# Patient Record
Sex: FEMALE | Race: WHITE | NOT HISPANIC OR LATINO
[De-identification: names, ages, dates, MRNs, and addresses within clinical notes are randomized per-mention and may not be internally consistent; named-entity substitution may affect disease eponyms.]

---

## 2017-10-15 ENCOUNTER — HEALTH MAINTENANCE LETTER (OUTPATIENT)
Age: 27
End: 2017-10-15

## 2018-01-02 ENCOUNTER — APPOINTMENT (OUTPATIENT)
Dept: ULTRASOUND IMAGING | Facility: CLINIC | Age: 28
End: 2018-01-02
Attending: EMERGENCY MEDICINE
Payer: COMMERCIAL

## 2018-01-02 ENCOUNTER — ANESTHESIA EVENT (OUTPATIENT)
Dept: SURGERY | Facility: CLINIC | Age: 28
End: 2018-01-02
Payer: COMMERCIAL

## 2018-01-02 ENCOUNTER — HOSPITAL ENCOUNTER (INPATIENT)
Facility: CLINIC | Age: 28
LOS: 5 days | Discharge: HOME | End: 2018-01-07
Attending: EMERGENCY MEDICINE | Admitting: SURGERY
Payer: COMMERCIAL

## 2018-01-02 ENCOUNTER — APPOINTMENT (OUTPATIENT)
Dept: CT IMAGING | Facility: CLINIC | Age: 28
End: 2018-01-02
Attending: EMERGENCY MEDICINE
Payer: COMMERCIAL

## 2018-01-02 ENCOUNTER — ANESTHESIA (OUTPATIENT)
Dept: SURGERY | Facility: CLINIC | Age: 28
End: 2018-01-02
Payer: COMMERCIAL

## 2018-01-02 DIAGNOSIS — K35.80 ACUTE APPENDICITIS, UNSPECIFIED ACUTE APPENDICITIS TYPE: ICD-10-CM

## 2018-01-02 DIAGNOSIS — K35.32: Primary | ICD-10-CM

## 2018-01-02 LAB
ALBUMIN SERPL-MCNC: 3.7 G/DL (ref 3.4–5)
ALBUMIN UR-MCNC: NEGATIVE MG/DL
ALP SERPL-CCNC: 105 U/L (ref 40–150)
ALT SERPL W P-5'-P-CCNC: 51 U/L (ref 0–50)
ANION GAP SERPL CALCULATED.3IONS-SCNC: 8 MMOL/L (ref 3–14)
APPEARANCE UR: CLEAR
AST SERPL W P-5'-P-CCNC: 26 U/L (ref 0–45)
B-HCG SERPL-ACNC: <1 IU/L (ref 0–5)
BACTERIA #/AREA URNS HPF: ABNORMAL /HPF
BASOPHILS # BLD AUTO: 0 10E9/L (ref 0–0.2)
BASOPHILS NFR BLD AUTO: 0.2 %
BILIRUB SERPL-MCNC: 0.8 MG/DL (ref 0.2–1.3)
BILIRUB UR QL STRIP: NEGATIVE
BUN SERPL-MCNC: 16 MG/DL (ref 7–30)
CALCIUM SERPL-MCNC: 8.9 MG/DL (ref 8.5–10.1)
CHLORIDE SERPL-SCNC: 103 MMOL/L (ref 94–109)
CO2 SERPL-SCNC: 26 MMOL/L (ref 20–32)
COLOR UR AUTO: YELLOW
CREAT SERPL-MCNC: 0.59 MG/DL (ref 0.52–1.04)
CREAT SERPL-MCNC: 0.61 MG/DL (ref 0.52–1.04)
DIFFERENTIAL METHOD BLD: ABNORMAL
EOSINOPHIL # BLD AUTO: 0 10E9/L (ref 0–0.7)
EOSINOPHIL NFR BLD AUTO: 0 %
ERYTHROCYTE [DISTWIDTH] IN BLOOD BY AUTOMATED COUNT: 11.9 % (ref 10–15)
GFR SERPL CREATININE-BSD FRML MDRD: >90 ML/MIN/1.7M2
GFR SERPL CREATININE-BSD FRML MDRD: >90 ML/MIN/1.7M2
GLUCOSE SERPL-MCNC: 169 MG/DL (ref 70–99)
GLUCOSE UR STRIP-MCNC: NEGATIVE MG/DL
GRAM STN SPEC: ABNORMAL
GRAM STN SPEC: ABNORMAL
HCT VFR BLD AUTO: 42.4 % (ref 35–47)
HGB BLD-MCNC: 14 G/DL (ref 11.7–15.7)
HGB UR QL STRIP: NEGATIVE
IMM GRANULOCYTES # BLD: 0.1 10E9/L (ref 0–0.4)
IMM GRANULOCYTES NFR BLD: 0.4 %
KETONES UR STRIP-MCNC: NEGATIVE MG/DL
LACTATE BLD-SCNC: 1.9 MMOL/L (ref 0.7–2)
LEUKOCYTE ESTERASE UR QL STRIP: NEGATIVE
LIPASE SERPL-CCNC: 141 U/L (ref 73–393)
LYMPHOCYTES # BLD AUTO: 1.5 10E9/L (ref 0.8–5.3)
LYMPHOCYTES NFR BLD AUTO: 8.8 %
MCH RBC QN AUTO: 28.7 PG (ref 26.5–33)
MCHC RBC AUTO-ENTMCNC: 33 G/DL (ref 31.5–36.5)
MCV RBC AUTO: 87 FL (ref 78–100)
MONOCYTES # BLD AUTO: 0.6 10E9/L (ref 0–1.3)
MONOCYTES NFR BLD AUTO: 3.7 %
MUCOUS THREADS #/AREA URNS LPF: PRESENT /LPF
NEUTROPHILS # BLD AUTO: 14.5 10E9/L (ref 1.6–8.3)
NEUTROPHILS NFR BLD AUTO: 86.9 %
NITRATE UR QL: NEGATIVE
NRBC # BLD AUTO: 0 10*3/UL
NRBC BLD AUTO-RTO: 0 /100
PH UR STRIP: 7 PH (ref 5–7)
PLATELET # BLD AUTO: 198 10E9/L (ref 150–450)
PLATELET # BLD AUTO: 240 10E9/L (ref 150–450)
POTASSIUM SERPL-SCNC: 3.7 MMOL/L (ref 3.4–5.3)
PROT SERPL-MCNC: 7.8 G/DL (ref 6.8–8.8)
RBC # BLD AUTO: 4.87 10E12/L (ref 3.8–5.2)
RBC #/AREA URNS AUTO: 0 /HPF (ref 0–2)
SODIUM SERPL-SCNC: 137 MMOL/L (ref 133–144)
SOURCE: ABNORMAL
SP GR UR STRIP: 1.01 (ref 1–1.03)
SPECIMEN SOURCE: ABNORMAL
SQUAMOUS #/AREA URNS AUTO: <1 /HPF (ref 0–1)
UROBILINOGEN UR STRIP-MCNC: 0 MG/DL (ref 0–2)
WBC # BLD AUTO: 16.7 10E9/L (ref 4–11)
WBC #/AREA URNS AUTO: <1 /HPF (ref 0–2)

## 2018-01-02 PROCEDURE — 25000125 ZZHC RX 250: Performed by: NURSE ANESTHETIST, CERTIFIED REGISTERED

## 2018-01-02 PROCEDURE — 0DTJ4ZZ RESECTION OF APPENDIX, PERCUTANEOUS ENDOSCOPIC APPROACH: ICD-10-PCS | Performed by: SURGERY

## 2018-01-02 PROCEDURE — 25000128 H RX IP 250 OP 636: Performed by: SURGERY

## 2018-01-02 PROCEDURE — 96375 TX/PRO/DX INJ NEW DRUG ADDON: CPT

## 2018-01-02 PROCEDURE — 44970 LAPAROSCOPY APPENDECTOMY: CPT | Performed by: SURGERY

## 2018-01-02 PROCEDURE — 25000128 H RX IP 250 OP 636: Performed by: NURSE ANESTHETIST, CERTIFIED REGISTERED

## 2018-01-02 PROCEDURE — 25000128 H RX IP 250 OP 636: Performed by: EMERGENCY MEDICINE

## 2018-01-02 PROCEDURE — 84702 CHORIONIC GONADOTROPIN TEST: CPT | Performed by: EMERGENCY MEDICINE

## 2018-01-02 PROCEDURE — 76856 US EXAM PELVIC COMPLETE: CPT

## 2018-01-02 PROCEDURE — 88304 TISSUE EXAM BY PATHOLOGIST: CPT | Mod: 26 | Performed by: SURGERY

## 2018-01-02 PROCEDURE — 25000566 ZZH SEVOFLURANE, EA 15 MIN: Performed by: SURGERY

## 2018-01-02 PROCEDURE — 27210995 ZZH RX 272: Performed by: EMERGENCY MEDICINE

## 2018-01-02 PROCEDURE — 25000125 ZZHC RX 250: Performed by: SURGERY

## 2018-01-02 PROCEDURE — 25000128 H RX IP 250 OP 636: Performed by: PHYSICIAN ASSISTANT

## 2018-01-02 PROCEDURE — 25000128 H RX IP 250 OP 636: Performed by: ANESTHESIOLOGY

## 2018-01-02 PROCEDURE — 83605 ASSAY OF LACTIC ACID: CPT | Performed by: EMERGENCY MEDICINE

## 2018-01-02 PROCEDURE — 87075 CULTR BACTERIA EXCEPT BLOOD: CPT | Performed by: SURGERY

## 2018-01-02 PROCEDURE — 82565 ASSAY OF CREATININE: CPT | Performed by: SURGERY

## 2018-01-02 PROCEDURE — 71000013 ZZH RECOVERY PHASE 1 LEVEL 1 EA ADDTL HR: Performed by: SURGERY

## 2018-01-02 PROCEDURE — 40000306 ZZH STATISTIC PRE PROC ASSESS II: Performed by: SURGERY

## 2018-01-02 PROCEDURE — 99285 EMERGENCY DEPT VISIT HI MDM: CPT | Mod: 25

## 2018-01-02 PROCEDURE — 87205 SMEAR GRAM STAIN: CPT | Performed by: SURGERY

## 2018-01-02 PROCEDURE — 96361 HYDRATE IV INFUSION ADD-ON: CPT

## 2018-01-02 PROCEDURE — 71000012 ZZH RECOVERY PHASE 1 LEVEL 1 FIRST HR: Performed by: SURGERY

## 2018-01-02 PROCEDURE — 25000125 ZZHC RX 250: Performed by: EMERGENCY MEDICINE

## 2018-01-02 PROCEDURE — 85049 AUTOMATED PLATELET COUNT: CPT | Performed by: SURGERY

## 2018-01-02 PROCEDURE — 80053 COMPREHEN METABOLIC PANEL: CPT | Performed by: EMERGENCY MEDICINE

## 2018-01-02 PROCEDURE — 12000011 ZZH R&B MS OVERFLOW

## 2018-01-02 PROCEDURE — 87070 CULTURE OTHR SPECIMN AEROBIC: CPT | Performed by: SURGERY

## 2018-01-02 PROCEDURE — 96376 TX/PRO/DX INJ SAME DRUG ADON: CPT

## 2018-01-02 PROCEDURE — 87186 SC STD MICRODIL/AGAR DIL: CPT | Performed by: SURGERY

## 2018-01-02 PROCEDURE — 27210794 ZZH OR GENERAL SUPPLY STERILE: Performed by: SURGERY

## 2018-01-02 PROCEDURE — 36415 COLL VENOUS BLD VENIPUNCTURE: CPT | Performed by: SURGERY

## 2018-01-02 PROCEDURE — 25000125 ZZHC RX 250: Performed by: ANESTHESIOLOGY

## 2018-01-02 PROCEDURE — 83690 ASSAY OF LIPASE: CPT | Performed by: EMERGENCY MEDICINE

## 2018-01-02 PROCEDURE — 87076 CULTURE ANAEROBE IDENT EACH: CPT | Performed by: SURGERY

## 2018-01-02 PROCEDURE — 37000009 ZZH ANESTHESIA TECHNICAL FEE, EACH ADDTL 15 MIN: Performed by: SURGERY

## 2018-01-02 PROCEDURE — 36000058 ZZH SURGERY LEVEL 3 EA 15 ADDTL MIN: Performed by: SURGERY

## 2018-01-02 PROCEDURE — 37000008 ZZH ANESTHESIA TECHNICAL FEE, 1ST 30 MIN: Performed by: SURGERY

## 2018-01-02 PROCEDURE — 87077 CULTURE AEROBIC IDENTIFY: CPT | Performed by: SURGERY

## 2018-01-02 PROCEDURE — 25000132 ZZH RX MED GY IP 250 OP 250 PS 637: Performed by: SURGERY

## 2018-01-02 PROCEDURE — 81001 URINALYSIS AUTO W/SCOPE: CPT | Performed by: EMERGENCY MEDICINE

## 2018-01-02 PROCEDURE — 85025 COMPLETE CBC W/AUTO DIFF WBC: CPT | Performed by: EMERGENCY MEDICINE

## 2018-01-02 PROCEDURE — 96374 THER/PROPH/DIAG INJ IV PUSH: CPT

## 2018-01-02 PROCEDURE — 25800025 ZZH RX 258: Performed by: SURGERY

## 2018-01-02 PROCEDURE — 88304 TISSUE EXAM BY PATHOLOGIST: CPT | Performed by: SURGERY

## 2018-01-02 PROCEDURE — 74177 CT ABD & PELVIS W/CONTRAST: CPT

## 2018-01-02 PROCEDURE — 36000056 ZZH SURGERY LEVEL 3 1ST 30 MIN: Performed by: SURGERY

## 2018-01-02 PROCEDURE — 99204 OFFICE O/P NEW MOD 45 MIN: CPT | Mod: 57 | Performed by: SURGERY

## 2018-01-02 RX ORDER — LIDOCAINE 40 MG/G
CREAM TOPICAL
Status: DISCONTINUED | OUTPATIENT
Start: 2018-01-02 | End: 2018-01-07 | Stop reason: HOSPADM

## 2018-01-02 RX ORDER — FENTANYL CITRATE 50 UG/ML
50 INJECTION, SOLUTION INTRAMUSCULAR; INTRAVENOUS
Status: DISCONTINUED | OUTPATIENT
Start: 2018-01-02 | End: 2018-01-02 | Stop reason: HOSPADM

## 2018-01-02 RX ORDER — MORPHINE SULFATE 2 MG/ML
3 INJECTION, SOLUTION INTRAMUSCULAR; INTRAVENOUS
Status: DISCONTINUED | OUTPATIENT
Start: 2018-01-02 | End: 2018-01-03

## 2018-01-02 RX ORDER — CEFOTETAN DISODIUM 1 G/10ML
1 INJECTION, POWDER, FOR SOLUTION INTRAMUSCULAR; INTRAVENOUS ONCE
Status: COMPLETED | OUTPATIENT
Start: 2018-01-02 | End: 2018-01-02

## 2018-01-02 RX ORDER — SODIUM CHLORIDE, SODIUM LACTATE, POTASSIUM CHLORIDE, CALCIUM CHLORIDE 600; 310; 30; 20 MG/100ML; MG/100ML; MG/100ML; MG/100ML
INJECTION, SOLUTION INTRAVENOUS CONTINUOUS
Status: DISCONTINUED | OUTPATIENT
Start: 2018-01-02 | End: 2018-01-02 | Stop reason: HOSPADM

## 2018-01-02 RX ORDER — ACETAMINOPHEN 325 MG/1
975 TABLET ORAL EVERY 8 HOURS
Status: DISCONTINUED | OUTPATIENT
Start: 2018-01-02 | End: 2018-01-03

## 2018-01-02 RX ORDER — ONDANSETRON 4 MG/1
4 TABLET, ORALLY DISINTEGRATING ORAL EVERY 30 MIN PRN
Status: DISCONTINUED | OUTPATIENT
Start: 2018-01-02 | End: 2018-01-02 | Stop reason: HOSPADM

## 2018-01-02 RX ORDER — GLYCOPYRROLATE 0.2 MG/ML
INJECTION, SOLUTION INTRAMUSCULAR; INTRAVENOUS PRN
Status: DISCONTINUED | OUTPATIENT
Start: 2018-01-02 | End: 2018-01-02

## 2018-01-02 RX ORDER — KETOROLAC TROMETHAMINE 30 MG/ML
INJECTION, SOLUTION INTRAMUSCULAR; INTRAVENOUS PRN
Status: DISCONTINUED | OUTPATIENT
Start: 2018-01-02 | End: 2018-01-02

## 2018-01-02 RX ORDER — HYDROMORPHONE HYDROCHLORIDE 1 MG/ML
.3-.5 INJECTION, SOLUTION INTRAMUSCULAR; INTRAVENOUS; SUBCUTANEOUS
Status: DISCONTINUED | OUTPATIENT
Start: 2018-01-02 | End: 2018-01-02

## 2018-01-02 RX ORDER — ONDANSETRON 2 MG/ML
4 INJECTION INTRAMUSCULAR; INTRAVENOUS ONCE
Status: COMPLETED | OUTPATIENT
Start: 2018-01-02 | End: 2018-01-02

## 2018-01-02 RX ORDER — HYDROMORPHONE HYDROCHLORIDE 1 MG/ML
0.5 INJECTION, SOLUTION INTRAMUSCULAR; INTRAVENOUS; SUBCUTANEOUS
Status: COMPLETED | OUTPATIENT
Start: 2018-01-02 | End: 2018-01-02

## 2018-01-02 RX ORDER — DEXTROSE MONOHYDRATE, SODIUM CHLORIDE, AND POTASSIUM CHLORIDE 50; 1.49; 4.5 G/1000ML; G/1000ML; G/1000ML
INJECTION, SOLUTION INTRAVENOUS CONTINUOUS
Status: DISCONTINUED | OUTPATIENT
Start: 2018-01-02 | End: 2018-01-03

## 2018-01-02 RX ORDER — ACETAMINOPHEN 10 MG/ML
1000 INJECTION, SOLUTION INTRAVENOUS ONCE
Status: COMPLETED | OUTPATIENT
Start: 2018-01-02 | End: 2018-01-02

## 2018-01-02 RX ORDER — LIDOCAINE 40 MG/G
CREAM TOPICAL
Status: DISCONTINUED | OUTPATIENT
Start: 2018-01-02 | End: 2018-01-02 | Stop reason: HOSPADM

## 2018-01-02 RX ORDER — HYDROMORPHONE HYDROCHLORIDE 1 MG/ML
0.5 INJECTION, SOLUTION INTRAMUSCULAR; INTRAVENOUS; SUBCUTANEOUS
Status: DISCONTINUED | OUTPATIENT
Start: 2018-01-02 | End: 2018-01-02

## 2018-01-02 RX ORDER — ONDANSETRON 2 MG/ML
4 INJECTION INTRAMUSCULAR; INTRAVENOUS EVERY 6 HOURS PRN
Status: DISCONTINUED | OUTPATIENT
Start: 2018-01-02 | End: 2018-01-07 | Stop reason: HOSPADM

## 2018-01-02 RX ORDER — PROPOFOL 10 MG/ML
INJECTION, EMULSION INTRAVENOUS PRN
Status: DISCONTINUED | OUTPATIENT
Start: 2018-01-02 | End: 2018-01-02

## 2018-01-02 RX ORDER — HYDROMORPHONE HYDROCHLORIDE 1 MG/ML
.3-.5 INJECTION, SOLUTION INTRAMUSCULAR; INTRAVENOUS; SUBCUTANEOUS EVERY 10 MIN PRN
Status: DISCONTINUED | OUTPATIENT
Start: 2018-01-02 | End: 2018-01-02 | Stop reason: HOSPADM

## 2018-01-02 RX ORDER — FENTANYL CITRATE 50 UG/ML
25-50 INJECTION, SOLUTION INTRAMUSCULAR; INTRAVENOUS
Status: DISCONTINUED | OUTPATIENT
Start: 2018-01-02 | End: 2018-01-02 | Stop reason: HOSPADM

## 2018-01-02 RX ORDER — NALOXONE HYDROCHLORIDE 0.4 MG/ML
.1-.4 INJECTION, SOLUTION INTRAMUSCULAR; INTRAVENOUS; SUBCUTANEOUS
Status: DISCONTINUED | OUTPATIENT
Start: 2018-01-02 | End: 2018-01-02 | Stop reason: HOSPADM

## 2018-01-02 RX ORDER — BUPIVACAINE HYDROCHLORIDE 5 MG/ML
INJECTION, SOLUTION EPIDURAL; INTRACAUDAL PRN
Status: DISCONTINUED | OUTPATIENT
Start: 2018-01-02 | End: 2018-01-02 | Stop reason: HOSPADM

## 2018-01-02 RX ORDER — ACETAMINOPHEN 325 MG/1
650 TABLET ORAL EVERY 4 HOURS PRN
Status: DISCONTINUED | OUTPATIENT
Start: 2018-01-05 | End: 2018-01-07 | Stop reason: HOSPADM

## 2018-01-02 RX ORDER — ONDANSETRON 2 MG/ML
4 INJECTION INTRAMUSCULAR; INTRAVENOUS EVERY 30 MIN PRN
Status: DISCONTINUED | OUTPATIENT
Start: 2018-01-02 | End: 2018-01-02 | Stop reason: HOSPADM

## 2018-01-02 RX ORDER — PROCHLORPERAZINE MALEATE 10 MG
10 TABLET ORAL EVERY 6 HOURS PRN
Status: DISCONTINUED | OUTPATIENT
Start: 2018-01-02 | End: 2018-01-07 | Stop reason: HOSPADM

## 2018-01-02 RX ORDER — SODIUM CHLORIDE, SODIUM LACTATE, POTASSIUM CHLORIDE, CALCIUM CHLORIDE 600; 310; 30; 20 MG/100ML; MG/100ML; MG/100ML; MG/100ML
INJECTION, SOLUTION INTRAVENOUS CONTINUOUS PRN
Status: DISCONTINUED | OUTPATIENT
Start: 2018-01-02 | End: 2018-01-02

## 2018-01-02 RX ORDER — IOPAMIDOL 755 MG/ML
500 INJECTION, SOLUTION INTRAVASCULAR ONCE
Status: COMPLETED | OUTPATIENT
Start: 2018-01-02 | End: 2018-01-02

## 2018-01-02 RX ORDER — MEPERIDINE HYDROCHLORIDE 25 MG/ML
12.5 INJECTION INTRAMUSCULAR; INTRAVENOUS; SUBCUTANEOUS
Status: DISCONTINUED | OUTPATIENT
Start: 2018-01-02 | End: 2018-01-02 | Stop reason: HOSPADM

## 2018-01-02 RX ORDER — KETOROLAC TROMETHAMINE 15 MG/ML
15 INJECTION, SOLUTION INTRAMUSCULAR; INTRAVENOUS EVERY 6 HOURS PRN
Status: DISCONTINUED | OUTPATIENT
Start: 2018-01-02 | End: 2018-01-05

## 2018-01-02 RX ORDER — NEOSTIGMINE METHYLSULFATE 1 MG/ML
VIAL (ML) INJECTION PRN
Status: DISCONTINUED | OUTPATIENT
Start: 2018-01-02 | End: 2018-01-02

## 2018-01-02 RX ORDER — ONDANSETRON 4 MG/1
4 TABLET, ORALLY DISINTEGRATING ORAL EVERY 6 HOURS PRN
Status: DISCONTINUED | OUTPATIENT
Start: 2018-01-02 | End: 2018-01-07 | Stop reason: HOSPADM

## 2018-01-02 RX ORDER — NALOXONE HYDROCHLORIDE 0.4 MG/ML
.1-.4 INJECTION, SOLUTION INTRAMUSCULAR; INTRAVENOUS; SUBCUTANEOUS
Status: DISCONTINUED | OUTPATIENT
Start: 2018-01-02 | End: 2018-01-07 | Stop reason: HOSPADM

## 2018-01-02 RX ORDER — OXYCODONE HYDROCHLORIDE 5 MG/1
5-10 TABLET ORAL
Status: DISCONTINUED | OUTPATIENT
Start: 2018-01-02 | End: 2018-01-07 | Stop reason: HOSPADM

## 2018-01-02 RX ADMIN — MORPHINE SULFATE 3 MG: 2 INJECTION, SOLUTION INTRAMUSCULAR; INTRAVENOUS at 22:15

## 2018-01-02 RX ADMIN — MORPHINE SULFATE 3 MG: 2 INJECTION, SOLUTION INTRAMUSCULAR; INTRAVENOUS at 19:48

## 2018-01-02 RX ADMIN — Medication 0.5 MG: at 04:39

## 2018-01-02 RX ADMIN — ACETAMINOPHEN 1000 MG: 10 INJECTION, SOLUTION INTRAVENOUS at 07:54

## 2018-01-02 RX ADMIN — SODIUM CHLORIDE, POTASSIUM CHLORIDE, SODIUM LACTATE AND CALCIUM CHLORIDE: 600; 310; 30; 20 INJECTION, SOLUTION INTRAVENOUS at 05:38

## 2018-01-02 RX ADMIN — GLYCOPYRROLATE 0.2 MG: 0.2 INJECTION, SOLUTION INTRAMUSCULAR; INTRAVENOUS at 06:15

## 2018-01-02 RX ADMIN — CEFOTETAN DISODIUM 1 G: 1 INJECTION, POWDER, FOR SOLUTION INTRAMUSCULAR; INTRAVENOUS at 04:39

## 2018-01-02 RX ADMIN — Medication 0.5 MG: at 07:52

## 2018-01-02 RX ADMIN — ONDANSETRON 4 MG: 2 INJECTION INTRAMUSCULAR; INTRAVENOUS at 13:20

## 2018-01-02 RX ADMIN — ROCURONIUM BROMIDE 30 MG: 10 INJECTION INTRAVENOUS at 06:15

## 2018-01-02 RX ADMIN — POTASSIUM CHLORIDE, DEXTROSE MONOHYDRATE AND SODIUM CHLORIDE: 150; 5; 450 INJECTION, SOLUTION INTRAVENOUS at 09:37

## 2018-01-02 RX ADMIN — Medication 0.5 MG: at 00:52

## 2018-01-02 RX ADMIN — ROCURONIUM BROMIDE 5 MG: 10 INJECTION INTRAVENOUS at 06:40

## 2018-01-02 RX ADMIN — Medication 0.5 MG: at 17:47

## 2018-01-02 RX ADMIN — TAZOBACTAM SODIUM AND PIPERACILLIN SODIUM 3.38 G: 375; 3 INJECTION, SOLUTION INTRAVENOUS at 22:20

## 2018-01-02 RX ADMIN — ONDANSETRON 4 MG: 2 INJECTION INTRAMUSCULAR; INTRAVENOUS at 06:56

## 2018-01-02 RX ADMIN — GLYCOPYRROLATE 0.6 MG: 0.2 INJECTION, SOLUTION INTRAMUSCULAR; INTRAVENOUS at 07:11

## 2018-01-02 RX ADMIN — TAZOBACTAM SODIUM AND PIPERACILLIN SODIUM 3.38 G: 375; 3 INJECTION, SOLUTION INTRAVENOUS at 16:33

## 2018-01-02 RX ADMIN — FENTANYL CITRATE 50 MCG: 50 INJECTION INTRAMUSCULAR; INTRAVENOUS at 06:02

## 2018-01-02 RX ADMIN — FENTANYL CITRATE 50 MCG: 50 INJECTION INTRAMUSCULAR; INTRAVENOUS at 08:39

## 2018-01-02 RX ADMIN — MIDAZOLAM 2 MG: 1 INJECTION INTRAMUSCULAR; INTRAVENOUS at 06:09

## 2018-01-02 RX ADMIN — FENTANYL CITRATE 50 MCG: 50 INJECTION INTRAMUSCULAR; INTRAVENOUS at 07:44

## 2018-01-02 RX ADMIN — TAZOBACTAM SODIUM AND PIPERACILLIN SODIUM 3.38 G: 375; 3 INJECTION, SOLUTION INTRAVENOUS at 10:12

## 2018-01-02 RX ADMIN — ACETAMINOPHEN 975 MG: 325 TABLET, FILM COATED ORAL at 16:32

## 2018-01-02 RX ADMIN — FENTANYL CITRATE 50 MCG: 50 INJECTION INTRAMUSCULAR; INTRAVENOUS at 07:47

## 2018-01-02 RX ADMIN — FENTANYL CITRATE 100 MCG: 50 INJECTION INTRAMUSCULAR; INTRAVENOUS at 06:15

## 2018-01-02 RX ADMIN — SODIUM CHLORIDE 61 ML: 9 INJECTION, SOLUTION INTRAVENOUS at 03:30

## 2018-01-02 RX ADMIN — Medication 0.5 MG: at 02:10

## 2018-01-02 RX ADMIN — KETOROLAC TROMETHAMINE 30 MG: 30 INJECTION, SOLUTION INTRAMUSCULAR at 07:06

## 2018-01-02 RX ADMIN — IOPAMIDOL 84 ML: 755 INJECTION, SOLUTION INTRAVENOUS at 03:29

## 2018-01-02 RX ADMIN — FENTANYL CITRATE: 50 INJECTION INTRAMUSCULAR; INTRAVENOUS at 08:27

## 2018-01-02 RX ADMIN — HYDROMORPHONE HYDROCHLORIDE 0.5 MG: 1 INJECTION, SOLUTION INTRAMUSCULAR; INTRAVENOUS; SUBCUTANEOUS at 06:35

## 2018-01-02 RX ADMIN — KETOROLAC TROMETHAMINE 15 MG: 15 INJECTION, SOLUTION INTRAMUSCULAR; INTRAVENOUS at 13:16

## 2018-01-02 RX ADMIN — Medication 0.5 MG: at 02:37

## 2018-01-02 RX ADMIN — Medication 50 MG: at 06:15

## 2018-01-02 RX ADMIN — Medication 0.5 MG: at 08:11

## 2018-01-02 RX ADMIN — Medication 3 MG: at 07:11

## 2018-01-02 RX ADMIN — POTASSIUM CHLORIDE, DEXTROSE MONOHYDRATE AND SODIUM CHLORIDE: 150; 5; 450 INJECTION, SOLUTION INTRAVENOUS at 20:58

## 2018-01-02 RX ADMIN — PROPOFOL 150 MG: 10 INJECTION, EMULSION INTRAVENOUS at 06:15

## 2018-01-02 RX ADMIN — ONDANSETRON 4 MG: 2 INJECTION INTRAMUSCULAR; INTRAVENOUS at 00:57

## 2018-01-02 RX ADMIN — Medication 0.5 MG: at 10:11

## 2018-01-02 RX ADMIN — SODIUM CHLORIDE, POTASSIUM CHLORIDE, SODIUM LACTATE AND CALCIUM CHLORIDE: 600; 310; 30; 20 INJECTION, SOLUTION INTRAVENOUS at 07:01

## 2018-01-02 RX ADMIN — SODIUM CHLORIDE 1000 ML: 9 INJECTION, SOLUTION INTRAVENOUS at 00:52

## 2018-01-02 RX ADMIN — HYDROMORPHONE HYDROCHLORIDE 0.5 MG: 1 INJECTION, SOLUTION INTRAMUSCULAR; INTRAVENOUS; SUBCUTANEOUS at 06:40

## 2018-01-02 RX ADMIN — Medication 0.5 MG: at 11:11

## 2018-01-02 RX ADMIN — Medication 0.5 MG: at 01:18

## 2018-01-02 ASSESSMENT — ENCOUNTER SYMPTOMS
VOMITING: 1
ABDOMINAL PAIN: 1
DYSURIA: 0
NAUSEA: 0
DIARRHEA: 0

## 2018-01-02 ASSESSMENT — PAIN DESCRIPTION - DESCRIPTORS: DESCRIPTORS: SHARP

## 2018-01-02 NOTE — H&P
[facility]  [facility] - H&P     Sonya Alvarado MRN# [id number]   Age: 27 year old YOB: 1990     HPI:  Patient has been experiencing acute lower abdominal pain for the past 12 hours associated with vomiting.  These symptoms have been increasing in severity.  The patient had the stomach flu with vomiting and diarrhea last week, but that had resolved.  The patient is currently trying to get pregnant and is not on birth control.      History is obtained from the patient    Review Of Systems:  Respiratory: No shortness of breath, dyspnea on exertion, cough, or hemoptysis  Cardiovascular: negative  Gastrointestinal: as above  Genitourinary: negative    PMH:  History reviewed. No pertinent past medical history.    PSH:  History reviewed. No pertinent surgical history.    Allergies:  No Known Allergies    Home Medications:  No current outpatient prescriptions on file.       Social History:  Social History   Substance Use Topics     Smoking status: Never Smoker     Smokeless tobacco: Never Used     Alcohol use No       Family History:  Family history reviewed and not pertinent.    Objective:  /58  Pulse 84  Temp 98.5  F (36.9  C) (Temporal)  Resp 18  LMP 12/18/2017 (Exact Date)  SpO2 96%    General appearance: healthy, alert and significant distress  Hydration: well hydrated  Neck: normal and supple  Lungs: normal and clear to auscultation  Heart: regular rate and rhythm and no murmurs, clicks, or gallops  Abdomen: rounded,  hypoactive bowel sounds.   Tenderness: present: Across the lower abdomen, but worse on the right.  Masses: none  Organomegaly: none    Labs Reviewed:  Lab Results   Component Value Date    WBC 16.7 01/02/2018     Lab Results   Component Value Date    HGB 14.0 01/02/2018     Lab Results   Component Value Date     01/02/2018       Last Basic Metabolic Panel:  Lab Results   Component Value Date     01/02/2018      Lab Results   Component  Value Date    POTASSIUM 3.7 01/02/2018     Lab Results   Component Value Date    CHLORIDE 103 01/02/2018     Lab Results   Component Value Date    INDER 8.9 01/02/2018     Lab Results   Component Value Date    CO2 26 01/02/2018     Lab Results   Component Value Date    BUN 16 01/02/2018     Lab Results   Component Value Date    CR 0.61 01/02/2018     Lab Results   Component Value Date     01/02/2018         Radiology:  CT abdomen reveals inflammatory change on the right side of the pelvis.  This is felt most likely due to appendicitis, though there is the question of colitis as well.  Ultrasound showed findings concerning for acute appendicitis.  All imaging studies reviewed by me.    ASSESSMENT/PLAN:  The patient's history, physical exam, laboratory and imaging studies are most suspicious for acute appendicitis.  I have offered the patient laparoscopic appendectomy.  The risks, benefits, and alternatives have been discussed in detail.  We specifically discussed the possibility that this is something other than acute appendicitis.  If the appendix is not obviously inflamed, we will certainly make every attempt to try to determine the etiology of the inflammation.  All of the patient's questions have been answered.  They elect to proceed and we will go to the OR at the soonest availability.  Pre-operative antibiotics have been ordered.     Chuck Summers MD

## 2018-01-02 NOTE — ANESTHESIA POSTPROCEDURE EVALUATION
Patient: Sonya Alvarado    Procedure(s):  LAPAROSCOPIC APPENDECTOMY   - Wound Class: III-Contaminated    Diagnosis:appendicits   Diagnosis Additional Information: appendicitis with perforation and diffuse peritonitis     Anesthesia Type:  General, ETT    Note:  Anesthesia Post Evaluation    Patient location during evaluation: PACU  Patient participation: Able to fully participate in evaluation  Level of consciousness: awake  Pain management: adequate  Cardiovascular status: acceptable  Respiratory status: acceptable  Hydration status: acceptable  PONV: controlled     Anesthetic complications: None          Last vitals:  Vitals:    01/02/18 0815 01/02/18 0830 01/02/18 0845   BP: 128/82 129/74 131/74   Pulse:      Resp: 17 16 14   Temp:      SpO2: 96% 94% 94%         Electronically Signed By: Gabe Bradley MD  January 2, 2018  8:50 AM

## 2018-01-02 NOTE — LETTER
[facility]  [address]  [facility] [address]  [phone]          January 5, 2018    RE:  Sonya Alvarado                                                                                                                                                       [address] [facility] [address]            To whom it may concern:    Sonya PRADHAN Jenny is under my professional care for surgery.  She may return to work without restrictions on 1/22/2018.        Sincerely,             Nu Randall PA-C

## 2018-01-02 NOTE — IP AVS SNAPSHOT
MRN:[id number]                      After Visit Summary   1/2/2018    Sonya Alvarado    MRN: [id number]           Thank you!     Thank you for choosing [facility] for your care. Our goal is always to provide you with excellent care. Hearing back from our patients is one way we can continue to improve our services. Please take a few minutes to complete the written survey that you may receive in the mail after you visit. If you would like to speak to someone directly about your visit please contact Patient Relations at [phone]. Thank you!          Patient Information     Date Of Birth          1990        Designated Caregiver       Most Recent Value    Caregiver    Will someone help with your care after discharge? yes    Name of designated caregiver Gudelia    Phone number of caregiver [phone]    Caregiver address [place]      About your hospital stay     You were admitted on:  January 2, 2018 You last received care in the:  [facility]    You were discharged on:  January 7, 2018       Who to Call     For medical emergencies, please call 911.  For non-urgent questions about your medical care, please call your primary care provider or clinic, [phone]  For questions related to your surgery, please call your surgery clinic        Attending Provider     Provider Specialty    Zayra Mendez MD Emergency Medicine    Chuck Summers MD General Surgery       Primary Care Provider Office Phone # Fax #    [facility] [phone]      Further instructions from your care team       HOME CARE FOLLOWING APPENDECTOMY  JAVON Frazier E. Gavin, N. Guttormson, D. Maurer, RMATTHEW Steele    INCISIONAL CARE:  Replace the bandage over your incision (or incisions) until all drainage stops, or if more comfortable to have in place.  If present, leave the steri-strips (white paper tapes) in place for 14 days  after surgery.  If you have staples in your incision at the time of discharge, they will be removed at your follow-up appointment.  If Dermabond (a type of skin glue) is present, leave in place until it wears/flakes off.     BATHING:  Avoid baths for 1 week after surgery.  Showers are okay.  You may wash your hair at any time.  Gently pat your incision dry after bathing.    ACTIVITY:  Light Activity -- you may immediately be up and about as tolerated.  Driving -- you may drive when comfortable and off narcotic pain medications.  Light Work -- resume when comfortable off pain medications.  (If you can drive, you probably can work.)  Strenuous Work/Activity -- limit lifting to 20 pounds for 1 week.  Progressively increase with time.  Active Sports (running, biking, etc.) -- cautiously resume after 2 weeks.    DISCOMFORT:  Use pain medications as prescribed by your surgeon.  Take the pain medication with some food, when possible, to minimize side effects.  Intermittent use of ice packs at the incision sites may help during the first 48 hours.  Expect gradual improvement.    DIET:  No restrictions.  Drink plenty of fluids.  While taking pain medications, increase dietary fiber or add a fiber supplementation like Metamucil or Citrucel to help prevent constipation - a possible side effect of pain medications.    NAUSEA:  If nauseated from the anesthetic/pain meds; rest in bed, get up cautiously with assistance, and drink clear liquids (juice, tea, broth).    RETURN APPOINTMENT:  Schedule a follow-up visit 2-3 weeks post-op.  Office Phone:  [phone]     CONTACT US IF THE FOLLOWING DEVELOPS:   1. A fever that is above 101     2. If there is a large amount of drainage, bleeding, or swelling.   3. Severe pain that is not relieved by your prescription.   4. Drainage that is thick, cloudy, yellow, green or white.   5. Any other questions not answered by  Frequently Asked Questions  sheet.      FREQUENTLY ASKED  QUESTIONS:    Q:  How should my incision look?    A:  Normally your incision will appear slightly swollen with light redness directly along the incision itself as it heals.  It may feel like a bump or ridge as the healing/scarring happens, and over time (3-4 months) this bump or ridge feeling should slowly go away.  In general, clear or pink watery drainage can be normal at first as your incision heals, but should decrease over time.    Q:  How do I know if my incision is infected?  A:  Look at your incision for signs of infection, like redness around the incision spreading to surrounding skin, or drainage of cloudy or foul-smelling drainage.  If you feel warm, check your temperature to see if you are running a fever.    **If any of these things occur, please notify the nurse at our office.  We may need you to come into the office for an incision check.      Q:  How do I take care of my incision?  A:  If you have a dressing in place - Starting the day after surgery, replace the dressing 1-2 times a day until there is no further drainage from the incision.  At that time, a dressing is no longer needed.  Try to minimize tape on the skin if irritation is occurring at the tape sites.  If you have significant irritation from tape on the skin, please call the office to discuss other method of dressing your incision.    Small pieces of tape called  steri-strips  may be present directly overlying your incision; these may be removed 10 days after surgery unless otherwise specified by your surgeon.  If these tapes start to loosen at the ends, you may trim them back until they fall off or are removed.    A:  If you had  Dermabond  tissue glue used as a dressing (this causes your incision to look shiny with a clear covering over it) - This type of dressing wears off with time and does not require more dressings over the top unless it is draining around the glue as it wears off.  Do not apply ointments or lotions over the  incisions until the glue has completely worn off.    Q:  There is a piece of tape or a sticky  lead  still on my skin.  Can I remove this?  A:  Sometimes the sticky  leads  used for monitoring during surgery or for evaluation in the emergency department are not all removed while you are in the hospital.  These sometimes have a tab or metal dot on them.  You can easily remove these on your own, like taking off a band-aid.  If there is a gel substance under the  lead , simply wipe/clean it off with a washcloth or paper towel.      Q:  What can I do to minimize constipation (very hard stools, or lack of stools)?  A:  Stay well hydrated.  Increase your dietary fiber intake or take a fiber supplement -with plenty of water.  Walk around frequently.  You may consider an over-the-counter stool-softener.  Your Pharmacist can assist you with choosing one that is stocked at your pharmacy.  Constipation is also one of the most common side effects of pain medication.  If you are using pain medication, be pro-active and try to PREVENT problems with constipation by taking the steps above BEFORE constipation becomes a problem.    Q:  What do I do if I need more pain medications?  A:  Call the office to receive refills.  Be aware that certain pain meds cannot be called into a pharmacy and actually require a paper prescription.  A change may be made in your pain med as you progress thru your recovery period or if you have side effects to certain meds.    --Pain meds are NOT refilled after 5pm on weekdays, and NOT AT ALL on the weekends, so please look ahead to prevent problems.      Q:  Why am I having a hard time sleeping now that I am at home?  A:  Many medications you receive while you are in the hospital can impact your sleep for a number of days after your surgery/hospitalization.  Decreased level of activity and naps during the day may also make sleeping at night difficult.  Try to minimize day-time naps, and get up frequently  during the day to walk around your home during your recovery time.  Sleep aides may be of some help, but are not recommended for long-term use.      Q:  I am having some back discomfort.  What should I do?  A:  This may be related to certain positioning that was required for your surgery, extended periods of time in bed, or other changes in your overall activity level.  You may try ice, heat, acetaminophen, or ibuprofen to treat this temporarily.  Note that many pain medications have acetaminophen in them and would state this on the prescription bottle.  Be sure not to exceed the maximum of 4000mg per day of acetaminophen.     **If the pain you are having does not resolve, is severe, or is a flare of back pain you have had on other occasions prior to surgery, please contact your primary physician for further recommendations or for an appointment to be examined at their office.    Q:  Why am I having headaches?  A:  Headaches can be caused by many things:  caffeine withdrawal, use of pain meds, dehydration, high blood pressure, lack of sleep, over-activity/exhaustion, flare-up of usual migraine headaches.  If you feel this is related to muscle tension (a band-like feeling around the head, or a pressure at the low-back of the head) you may try ice or heat to this area.  You may need to drink more fluids (try electrolyte drink like Gatorade), rest, or take your usual migraine medications.   **If your headaches do not resolve, worsen, are accompanied by other symptoms, or if your blood pressure is high, please call your primary physician for recommendation and/or examination.    Q:  I am unable to urinate.  What do I do?  A:  A small percentage of people can have difficulty urinating initially after surgery.  This includes being able to urinate only a very small amount at a time and feeling discomfort or pressure in the very low abdomen.  This is called  urinary retention , and is actually an urgent situation.  Proceed  to your nearest Emergency department for evaluation (not an Urgent Care Center).  Sometimes the bladder does not work correctly after certain medications you receive during surgery, or related to certain procedures.  You may need to have a catheter placed until your bladder recovers.  When planning to go to an Emergency department, it may help to call the ER to let them know you are coming in for this problem after a surgery.  This may help you get in quicker to be evaluated.  **If you have symptoms of a urinary tract infection, please contact your primary physician for the proper evaluation and treatment.          If you have other questions, please call the office Monday thru Friday between 8am and 5pm to discuss with the nurse or physician assistant.  #[phone]    There is a surgeon ON CALL on weekday evenings and over the weekend in case of urgent need only, and may be contacted at the same number.    If you are having an emergency, call 911 or proceed to your nearest emergency department.            Pending Results     Date and Time Order Name Status Description    1/2/2018 0644 Anaerobic bacterial culture Preliminary             Statement of Approval     Ordered          01/07/18 0931  I have reviewed and agree with all the recommendations and orders detailed in this document.  EFFECTIVE NOW     Approved and electronically signed by:  Latrell Gaona PA-C             Admission Information     Date & Time Provider Department Dept. Phone    1/2/2018 Chuck Summers MD [facility] [phone]      Your Vitals Were     Blood Pressure Pulse Temperature Respirations Height Weight    124/76 61 97.7  F (36.5  C) (Oral) 18 1.524 m (5') 88 kg (194 lb 0.1 oz)    Last Period Pulse Oximetry BMI (Body Mass Index)             12/18/2017 (Exact Date) 100% 37.89 kg/m2         MyChart Information     [facility] lets you send messages to your doctor, view your test results, renew your  "prescriptions, schedule appointments and more. To sign up, go to [email or web address] . Click on \"Log in\" on the left side of the screen, which will take you to the Welcome page. Then click on \"Sign up Now\" on the right side of the page.     You will be asked to enter the access code listed below, as well as some personal information. Please follow the directions to create your username and password.     Your access code is: [email or web address]  Expires: 2018  9:35 AM     Your access code will  in 90 days. If you need help or a new code, please call your [facility] clinic or [phone].        Care EveryWhere ID     This is your Care EveryWhere ID. This could be used by other organizations to access your [facility] medical records  [id number]        Equal Access to Services     [facility]ELLA [facility] Center[facility]LEIKA : Ke Ozuna, marivel sosa, rolf hinojosa, lele reyes . So [facility] [phone].    ATENCIÓN: Si habla español, tiene a johnson disposición servicios gratuitos de asistencia lingüística. Lizz al [phone].    We comply with applicable federal civil rights laws and [place] laws. We do not discriminate on the basis of race, color, national origin, age, disability, sex, sexual orientation, or gender identity.               Review of your medicines      START taking        Dose / Directions    ciprofloxacin 250 MG tablet   Commonly known as:  CIPRO   Indication:  Infection Following Surgery   Notes to Patient:  Do not take your multivitamin while taking this medication.  It decreases absorption.        Dose:  250 mg   Take 1 tablet (250 mg) by mouth every 12 hours for 7 days   Quantity:  14 tablet   Refills:  0       metroNIDAZOLE 500 MG tablet   Commonly known as:  FLAGYL   Indication:  Infection Following Surgery   Notes to Patient:  Do not take your multivitamin while taking this medication.  It decreases absorption.        Dose:  500 mg   Take 1 tablet (500 mg) " by mouth every 8 hours for 7 days   Quantity:  21 tablet   Refills:  0         CONTINUE these medicines which have NOT CHANGED        Dose / Directions    FISH OIL PO        Dose:  2 capsule   Take 2 capsules by mouth daily   Refills:  0         STOP taking     WOMENS MULTIVITAMIN PO                Where to get your medicines      These medications were sent to [facility] [place] - [address] [facility]  [address] [facility] [address]     Phone:  [phone]     ciprofloxacin 250 MG tablet    metroNIDAZOLE 500 MG tablet               ANTIBIOTIC INSTRUCTION     You've Been Prescribed an Antibiotic - Now What?  Your healthcare team thinks that you or your loved one might have an infection. Some infections can be treated with antibiotics, which are powerful, life-saving drugs. Like all medications, antibiotics have side effects and should only be used when necessary. There are some important things you should know about your antibiotic treatment.      Your healthcare team may run tests before you start taking an antibiotic.    Your team may take samples (e.g., from your blood, urine or other areas) to run tests to look for bacteria. These test can be important to determine if you need an antibiotic at all and, if you do, which antibiotic will work best.      Within a few days, your healthcare team might change or even stop your antibiotic.    Your team may start you on an antibiotic while they are working to find out what is making you sick.    Your team might change your antibiotic because test results show that a different antibiotic would be better to treat your infection.    In some cases, once your team has more information, they learn that you do not need an antibiotic at all. They may find out that you don't have an infection, or that the antibiotic you're taking won't work against your infection. For example, an infection caused by a virus can't be treated with  antibiotics. Staying on an antibiotic when you don't need it is more likely to be harmful than helpful.      You may experience side effects from your antibiotic.    Like all medications, antibiotics have side effects. Some of these can be serious.    Let you healthcare team know if you have any known allergies when you are admitted to the hospital.    One significant side effect of nearly all antibiotics is the risk of severe and sometimes deadly diarrhea caused by Clostridium difficile (C. Difficile). This occurs when a person takes antibiotics because some good germs are destroyed. Antibiotic use allows C. diificile to take over, putting patients at high risk for this serious infection.    As a patient or caregiver, it is important to understand your or your loved one's antibiotic treatment. It is especially important for caregivers to speak up when patients can't speak for themselves. Here are some important questions to ask your healthcare team.    What infection is this antibiotic treating and how do you know I have that infection?    What side effects might occur from this antibiotic?    How long will I need to take this antibiotic?    Is it safe to take this antibiotic with other medications or supplements (e.g., vitamins) that I am taking?     Are there any special directions I need to know about taking this antibiotic? For example, should I take it with food?    How will I be monitored to know whether my infection is responding to the antibiotic?    What tests may help to make sure the right antibiotic is prescribed for me?      Information provided by:  www.cdc.gov/getsmart  U.S. Department of Health and Human Services  Centers for disease Control and Prevention  National Center for Emerging and Zoonotic Infectious Diseases  Division of Healthcare Quality Promotion         Protect others around you: Learn how to safely use, store and throw away your medicines at www.disposemymeds.org.              Medication List: This is a list of all your medications and when to take them. Check marks below indicate your daily home schedule. Keep this list as a reference.      Medications           Morning Afternoon Evening Bedtime As Needed    ciprofloxacin 250 MG tablet   Commonly known as:  CIPRO   Take 1 tablet (250 mg) by mouth every 12 hours for 7 days   Last time this was given:  250 mg on 1/7/2018  6:06 AM   Notes to Patient:  Do not take your multivitamin while taking this medication.  It decreases absorption.                                      FISH OIL PO   Take 2 capsules by mouth daily                                   metroNIDAZOLE 500 MG tablet   Commonly known as:  FLAGYL   Take 1 tablet (500 mg) by mouth every 8 hours for 7 days   Last time this was given:  500 mg on 1/7/2018  6:05 AM   Notes to Patient:  Do not take your multivitamin while taking this medication.  It decreases absorption.

## 2018-01-02 NOTE — ANESTHESIA PREPROCEDURE EVALUATION
Anesthesia Evaluation     .             ROS/MED HX    ENT/Pulmonary:  - neg pulmonary ROS     Neurologic:  - neg neurologic ROS     Cardiovascular:  - neg cardiovascular ROS       METS/Exercise Tolerance:     Hematologic:  - neg hematologic  ROS       Musculoskeletal:  - neg musculoskeletal ROS       GI/Hepatic:     (+) appendicitis,      (-) GERD, hiatal hernia and other GI/Hepatic   Renal/Genitourinary:  - ROS Renal section negative       Endo:      (-) Type I DM, Type II DM, thyroid disease, chronic steroid usage, other endocrine disorder and obesity   Psychiatric:  - neg psychiatric ROS       Infectious Disease:  - neg infectious disease ROS       Malignancy:      - no malignancy   Other:    (+) No chance of pregnancy no H/O Chronic Pain,                   Physical Exam      Airway   Mallampati: II  TM distance: >3 FB  Neck ROM: full    Dental     Cardiovascular   Rhythm and rate: regular and normal  (-) no murmur    Pulmonary    breath sounds clear to auscultation    Other findings: Lab Test        01/02/18                       0046          WBC          16.7*         HGB          14.0          MCV          87            PLT          240            Lab Test        01/02/18                       0046          NA           137           POTASSIUM    3.7           CHLORIDE     103           CO2          26            BUN          16            CR           0.61          ANIONGAP     8             INDER          8.9           GLC          169*                        Anesthesia Plan      History & Physical Review  History and physical reviewed and following examination; no interval change.    ASA Status:  1 emergent.    NPO Status:  > 8 hours    Plan for General and ETT with Propofol induction. Maintenance will be Balanced.    PONV prophylaxis:  Ondansetron (or other 5HT-3) and Dexamethasone or Solumedrol       Postoperative Care  Postoperative pain management:  IV analgesics and Oral pain medications.       Consents  Anesthetic plan, risks, benefits and alternatives discussed with:  Patient..                          .

## 2018-01-02 NOTE — PLAN OF CARE
"Problem: Infection, Risk/Actual (Pediatric)  Goal: Identify Related Risk Factors and Signs and Symptoms  Related risk factors and signs and symptoms are identified upon initiation of Human Response Clinical Practice Guideline (CPG).  Outcome: No Change  Pt up with sba to bathroom. Sleepy and tmax 99.8. Ice to incision and given toradol and dilaudid and tylenol  (in PACU). Rates pain a \"6\" but is drowsy. EtCO2 40 and IPI 9-10. O2 sat on 2L 96%. Will monitor.      "

## 2018-01-02 NOTE — IP AVS SNAPSHOT
[facility]    [address]    [facility] [address]    Phone:  [phone]    Fax:  [phone]                                       After Visit Summary   1/2/2018    Sonya Alvarado    MRN: [id number]           After Visit Summary Signature Page     I have received my discharge instructions, and my questions have been answered. I have discussed any challenges I see with this plan with the nurse or doctor.    ..........................................................................................................................................  Patient/Patient Representative Signature      ..........................................................................................................................................  Patient Representative Print Name and Relationship to Patient    ..................................................               ................................................  Date                                            Time    ..........................................................................................................................................  Reviewed by Signature/Title    ...................................................              ..............................................  Date                                                            Time

## 2018-01-02 NOTE — ED PROVIDER NOTES
History     Chief Complaint:  Abdominal Pain    HPI   Sonya Alvarado is a 27 year old female who presents to the emergency department today for evaluation of bilateral lower quadrant abdominal pain. The  reports the patient developed mild bilateral lower quadrant abdominal pain at approximately 1600 today that has gradually worsened in the last several hours. The patient initially did not want to come in for evaluation as she has a high tolerance per the , but decided to come in after episode of emesis secondary to abdominal pain. She reports she was not nauseous with vomiting or now. Her last bowel movement was around 1830 tonight and normal. The  reports that the patient and him both had the stomach flu last week with vomiting and diarrhea and he had to come in for evaluation, but she was not as bad. She reports this does not feel like the stomach flu. She reports several months ago she went to Urgent Care for similar pain that was not as severe, and was referred to the ED to get imaging, but decided not to go in as her pain was beginning to subside. The etiology of that episode of abdominal pain was unknown. No imaging was done at the Urgent Care. She is not on her period and she last had her cycle on 12/18/17. She is trying to get pregnant and is not on birth control. She denies previous abdominal surgeries. The patient denies dysuria, diarrhea, or nausea.     Allergies:  No Known Drug Allergies    Medications:    Medications reviewed. No current medications.     Past Medical History:    Medical history reviewed. No pertinent medical history.    Past Surgical History:    Surgical history reviewed. No pertinent surgical history.    Family History:    Family history reviewed. No pertinent family history.     Social History:  The patient was accompanied to the ED by .  Smoking Status: Never Smoker  Alcohol Use: Negative   Marital Status:      Review of Systems    Gastrointestinal: Positive for abdominal pain (bilateral lower quadrant; severe) and vomiting. Negative for diarrhea and nausea.   Genitourinary: Negative for dysuria.   All other systems reviewed and are negative.    Physical Exam     Patient Vitals for the past 24 hrs:   BP Temp Temp src Pulse Resp SpO2   01/02/18 0522 107/58 98.5  F (36.9  C) Temporal - 18 -   01/02/18 0315 - - - - - 96 %   01/02/18 0300 104/66 - - - - 94 %   01/02/18 0245 - - - - - 96 %   01/02/18 0239 103/64 - - - - 99 %   01/02/18 0230 99/60 - - - - 99 %   01/02/18 0215 - - - - - 97 %   01/02/18 0213 - - - - - 97 %   01/02/18 0145 - - - - - 96 %   01/02/18 0130 106/70 - - - - 94 %   01/02/18 0115 112/66 - - - - 98 %   01/02/18 0051 - - - - - 100 %   01/02/18 0020 103/70 97.8  F (36.6  C) Temporal 84 18 97 %     Physical Exam  General: Patient is alert and interactive when I enter the room  Head:  The scalp, face, and head appear normal  Eyes:  Conjunctivae are normal  ENT:    The nose is normal    Pinnae are normal    External acoustic canals are normal  Neck:  Trachea midline  CV:  Pulses are normal.    Resp:  No respiratory distress   Abdomen:      Significant RLQ and LLQ, no rebound, guarding, non-distended  Musc:  Normal muscular tone    No major joint effusions    No asymmetric leg swelling    Good capillary refill noted  Skin:  No rash or lesions noted  Neuro:  Speech is normal and fluent. Face is symmetric.     Moving all extremities well.   Psych: Awake. Alert.  Normal affect.  Appropriate interactions.    Emergency Department Course     Imaging:  Radiology findings were communicated with the patient who voiced understanding of the findings.    Abd/pelvis CT,  IV  contrast only TRAUMA / AAA  1. Findings which may all be due to acute appendicitis as suggested on ultrasound.  2. However, portions of the colon also have a slightly thick-walled appearance which may be due to colitis. Correlate clinically.  3. Terminal ileum is fluid  filled and mildly distended probably due to associated ileus.  Findings discussed with the referring physician at 3:40 a.m.  FLOR HUMPHRIES MD  Reading per radiology    US Pel W/Trans*  1. Findings which are moderately suspicious for acute appendicitis. Correlate clinically.  2. Small amount of free fluid.  3. Pelvic structures otherwise negative.  Findings discussed with the referring physician at 2:25 a.m.  FLOR HUMPHRIES MD  Reading per radiology    Laboratory:  Laboratory findings were communicated with the patient who voiced understanding of the findings.    UA: Bacteria: Moderate (A), Mucous: Present (A)  CBC: WBC 16.7 (H), HGB 14.0,   CMP: Glucose: 169 (H), ALT: 54 (H), o/w WNL (Creatinine 0.61)  Lipase: 141  Lactic Acid (Collected at 0046): 1.9  HCG Quantitative pregnancy: <1    Interventions:  0052 Dilaudid 0.5 mg IV  0052 NS 1000 ml IV  0057 Zofran 4 mg IV  0118 Dilaudid 0.5 mg IV  0210 Dilaudid 0.5 mg IV  0237 Dilaudid 0.5 mg IV  0439 Dilaudid 0.5 mg IV  0439 Cefotan 1 g IV    Emergency Department Course:    0020 Nursing notes and vitals reviewed.    0037 I performed an exam of the patient as documented above.     0046 IV was inserted and blood was drawn for laboratory testing, results above.    0123 The patient was sent for a US Pel W/Trans*US Pel W/Trans* while in the emergency department, results above.     0225 Radiology called me to discuss Ultrasound findings.     0326 The patient was sent for a Abd/pelvis CT,  IV  contrast only TRAUMA / AAA while in the emergency department, results above.     0340 Radiology called me to discuss abdomen CT findings.     0344 The patient provided a urine sample here in the emergency department. This was sent for laboratory testing, findings above.    0357 I discussed the patient with Dr. Summers from Surgery, who will admit the patient to a monitored bed for further evaluation and treatment.     0408 I personally reviewed the imaging and laboratory  results with the patient and answered all related questions prior to admission. I discussed the treatment plan with the patient and . They expressed understanding of this plan and consented to admission.    Impression & Plan      Medical Decision Making:  Sonya Alvarado is a 27 year old female who presents to the emergency department today for evaluation of abdominal pain. Vitals were unremarkable.  Exam revealed right lower and left lower quadrant abdominal tenderness.  She had significant tenderness which was concerning for intra-abdominal pathology.  However, the bilateral nature and degree of pain was more consistent with ovarian pathology.  So an IV was placed and Dilaudid given for pain control as well as fluids.  She then was brought to ultrasound to evaluate for ovarian pathology such as a cyst or torsion.  Ultrasound actually showed findings that were concerning for appendicitis.  Given the question of whether she had appendicitis or not decision made to perform a CT.  Blood hCG was negative and white count was 16.  CT was shown which was somewhat concerning for appendicitis but also showed signs of possible colitis of the terminal ileum.  Small amount of fluid in the pelvis.  It is unclear whether or not she has appendicitis.  Her  and her had a stomach illness earlier so it is possible that she has colitis.  I touch base with Dr. Summers of surgery and after reviewing the case he thought that it would be reasonable just to perform a laparoscopic appendectomy to ensure that she does not have appendicitis or ruptured appendicitis.  Patient was given cefotetan for antibiotics.  She was continued to make n.p.o.  Patient was transferred to the OR for laparoscopic appendectomy.  Patient remained stable.    Diagnosis:    ICD-10-CM    1. Acute appendicitis, unspecified acute appendicitis type K35.80      Disposition:   The patient is discharged to home    Scribe Disclosure:  Lynn BURGOS am  serving as a scribe at 12:30 AM on 1/2/2018 to document services personally performed by Zayra Mendez MD based on my observations and the provider's statements to me.    [facility] EMERGENCY DEPARTMENT       Zayra Mendez MD  01/02/18 0711       Zayra Mendez MD  01/02/18 1283

## 2018-01-02 NOTE — ANESTHESIA CARE TRANSFER NOTE
Patient: Sonya Alvarado    Procedure(s):  LAPAROSCOPIC APPENDECTOMY   - Wound Class: III-Contaminated    Diagnosis: appendicits   Diagnosis Additional Information: No value filed.    Anesthesia Type:   General, ETT     Note:  Airway :ETT and T-piece  Patient transferred to:PACU  Handoff Report: Identifed the Patient, Identified the Reponsible Provider, Reviewed the pertinent medical history, Discussed the surgical course, Reviewed Intra-OP anesthesia mangement and issues during anesthesia, Set expectations for post-procedure period and Allowed opportunity for questions and acknowledgement of understanding      Vitals: (Last set prior to Anesthesia Care Transfer)    CRNA VITALS  1/2/2018 0655 - 1/2/2018 0728      1/2/2018             Resp Rate (observed): (!)  1                Electronically Signed By: COMFORT Davenport CRNA  January 2, 2018  7:28 AM

## 2018-01-02 NOTE — PHARMACY-ADMISSION MEDICATION HISTORY
Admission medication history interview status for this patient is complete. See [facility] admission navigator for allergy information, prior to admission medications and immunization status.     Prior to Admission medications    None

## 2018-01-03 LAB
COPATH REPORT: NORMAL
GLUCOSE BLDC GLUCOMTR-MCNC: 133 MG/DL (ref 70–99)
LACTATE BLD-SCNC: 0.8 MMOL/L (ref 0.7–2)

## 2018-01-03 PROCEDURE — 36415 COLL VENOUS BLD VENIPUNCTURE: CPT | Performed by: SURGERY

## 2018-01-03 PROCEDURE — 12000011 ZZH R&B MS OVERFLOW

## 2018-01-03 PROCEDURE — 25000128 H RX IP 250 OP 636: Performed by: PHYSICIAN ASSISTANT

## 2018-01-03 PROCEDURE — 25800025 ZZH RX 258: Performed by: PHYSICIAN ASSISTANT

## 2018-01-03 PROCEDURE — 00000146 ZZHCL STATISTIC GLUCOSE BY METER IP

## 2018-01-03 PROCEDURE — 83605 ASSAY OF LACTIC ACID: CPT | Performed by: SURGERY

## 2018-01-03 PROCEDURE — 25000128 H RX IP 250 OP 636: Performed by: SURGERY

## 2018-01-03 PROCEDURE — 25800025 ZZH RX 258: Performed by: SURGERY

## 2018-01-03 PROCEDURE — 25000132 ZZH RX MED GY IP 250 OP 250 PS 637: Performed by: SURGERY

## 2018-01-03 RX ORDER — MORPHINE SULFATE 4 MG/ML
3 INJECTION, SOLUTION INTRAMUSCULAR; INTRAVENOUS
Status: DISCONTINUED | OUTPATIENT
Start: 2018-01-03 | End: 2018-01-07 | Stop reason: HOSPADM

## 2018-01-03 RX ORDER — KETOROLAC TROMETHAMINE 15 MG/ML
15 INJECTION, SOLUTION INTRAMUSCULAR; INTRAVENOUS EVERY 6 HOURS PRN
Status: DISCONTINUED | OUTPATIENT
Start: 2018-01-03 | End: 2018-01-03

## 2018-01-03 RX ORDER — DEXTROSE MONOHYDRATE, SODIUM CHLORIDE, AND POTASSIUM CHLORIDE 50; 1.49; 4.5 G/1000ML; G/1000ML; G/1000ML
INJECTION, SOLUTION INTRAVENOUS CONTINUOUS
Status: DISCONTINUED | OUTPATIENT
Start: 2018-01-03 | End: 2018-01-06

## 2018-01-03 RX ORDER — ACETAMINOPHEN 10 MG/ML
1000 INJECTION, SOLUTION INTRAVENOUS EVERY 8 HOURS
Status: DISCONTINUED | OUTPATIENT
Start: 2018-01-03 | End: 2018-01-05

## 2018-01-03 RX ADMIN — ACETAMINOPHEN 1000 MG: 10 INJECTION, SOLUTION INTRAVENOUS at 17:06

## 2018-01-03 RX ADMIN — POTASSIUM CHLORIDE, DEXTROSE MONOHYDRATE AND SODIUM CHLORIDE: 150; 5; 450 INJECTION, SOLUTION INTRAVENOUS at 07:54

## 2018-01-03 RX ADMIN — ACETAMINOPHEN 975 MG: 325 TABLET, FILM COATED ORAL at 00:32

## 2018-01-03 RX ADMIN — TAZOBACTAM SODIUM AND PIPERACILLIN SODIUM 3.38 G: 375; 3 INJECTION, SOLUTION INTRAVENOUS at 23:40

## 2018-01-03 RX ADMIN — MORPHINE SULFATE 3 MG: 2 INJECTION, SOLUTION INTRAMUSCULAR; INTRAVENOUS at 04:49

## 2018-01-03 RX ADMIN — MORPHINE SULFATE 3 MG: 4 INJECTION INTRAVENOUS at 16:35

## 2018-01-03 RX ADMIN — POTASSIUM CHLORIDE, DEXTROSE MONOHYDRATE AND SODIUM CHLORIDE: 150; 5; 450 INJECTION, SOLUTION INTRAVENOUS at 23:28

## 2018-01-03 RX ADMIN — ENOXAPARIN SODIUM 40 MG: 40 INJECTION SUBCUTANEOUS at 06:58

## 2018-01-03 RX ADMIN — TAZOBACTAM SODIUM AND PIPERACILLIN SODIUM 3.38 G: 375; 3 INJECTION, SOLUTION INTRAVENOUS at 04:09

## 2018-01-03 RX ADMIN — PROCHLORPERAZINE EDISYLATE 4 MG: 5 INJECTION INTRAMUSCULAR; INTRAVENOUS at 18:00

## 2018-01-03 RX ADMIN — MORPHINE SULFATE 3 MG: 2 INJECTION, SOLUTION INTRAMUSCULAR; INTRAVENOUS at 00:42

## 2018-01-03 RX ADMIN — KETOROLAC TROMETHAMINE 15 MG: 15 INJECTION, SOLUTION INTRAMUSCULAR; INTRAVENOUS at 11:23

## 2018-01-03 RX ADMIN — MORPHINE SULFATE 3 MG: 4 INJECTION INTRAVENOUS at 09:15

## 2018-01-03 RX ADMIN — POTASSIUM CHLORIDE, DEXTROSE MONOHYDRATE AND SODIUM CHLORIDE: 150; 5; 450 INJECTION, SOLUTION INTRAVENOUS at 12:00

## 2018-01-03 RX ADMIN — SODIUM CHLORIDE 500 ML: 9 INJECTION, SOLUTION INTRAVENOUS at 11:16

## 2018-01-03 RX ADMIN — MORPHINE SULFATE 3 MG: 4 INJECTION INTRAVENOUS at 15:04

## 2018-01-03 RX ADMIN — ACETAMINOPHEN 975 MG: 325 TABLET, FILM COATED ORAL at 09:16

## 2018-01-03 RX ADMIN — PROCHLORPERAZINE EDISYLATE 6 MG: 5 INJECTION INTRAMUSCULAR; INTRAVENOUS at 17:48

## 2018-01-03 RX ADMIN — TAZOBACTAM SODIUM AND PIPERACILLIN SODIUM 3.38 G: 375; 3 INJECTION, SOLUTION INTRAVENOUS at 12:00

## 2018-01-03 RX ADMIN — KETOROLAC TROMETHAMINE 15 MG: 15 INJECTION, SOLUTION INTRAMUSCULAR; INTRAVENOUS at 18:09

## 2018-01-03 RX ADMIN — TAZOBACTAM SODIUM AND PIPERACILLIN SODIUM 3.38 G: 375; 3 INJECTION, SOLUTION INTRAVENOUS at 17:55

## 2018-01-03 RX ADMIN — ONDANSETRON 4 MG: 2 INJECTION INTRAMUSCULAR; INTRAVENOUS at 16:38

## 2018-01-03 NOTE — PLAN OF CARE
Problem: Infection, Risk/Actual (Pediatric)  Goal: Identify Related Risk Factors and Signs and Symptoms  Related risk factors and signs and symptoms are identified upon initiation of Human Response Clinical Practice Guideline (CPG).   Outcome: No Change  R.N.  VSS, afebrile, rates pain 6-7, morphine given x 2 for pain, lungs clear, positive bowel sounds, steri strips are dry and intact. Voided 250 cc of dark urine, capnagraphy WDL, will continue to monitor  closely and provide for needs

## 2018-01-03 NOTE — PLAN OF CARE
Problem: Patient Care Overview  Goal: Plan of Care/Patient Progress Review  Outcome: No Change  Afebrile. Pain managed with IV morphine. Urine dark vinayak, bolus given. NPO. Bowel sounds hypoactive. Incisions clean, dry, and intact. Incentive spirometry completed independently every hour and patient remained on room air.

## 2018-01-03 NOTE — PLAN OF CARE
Problem: Patient Care Overview  Goal: Plan of Care/Patient Progress Review  Outcome: No Change  Afebrile, VSS. Capnography WDL. Remains on 1L NC. Resting throughout shift. Pain consistently 6-7/10, no relief with Dilaudid, discontinued and morphine ordered. Pain improved with morphine. Scheduled tylenol and ice for comfort. Incision sites c/d/i. Remains NPO with ice chips. No bowel sounds at start of shift, now hypoactive. Void x1. PIV patent and infusing. Continuing zosyn. Will provide for needs.

## 2018-01-03 NOTE — PROGRESS NOTES
[facility]   General Surgery Progress Note           Assessment and Plan:   Assessment:   -Acute appendicitis with perforation and diffuse peritonitis  -Leukocytosis on admission due to above  -POD#1 s/p Laparoscopic appendectomy, peritoneal lavage  -Sepsis criteria met within first 24 hours of admission:  Leukocytosis, tachycardia, tachypnea; due to above  -Postoperative hypoxia requiring o2 supplementation, monitoring; currently on RA  -Expected postoperative ileus  -Afebrile, Tmax 99.8F      Plan:   -Pain management: IV morphine, switch to scheduled ofirmev, add toradol prn  -Prophylaxis:  PCDs, lovenox  -Diet restricted: NPO, sips of water  -IV Zosyn  -Bolus IVF, IVF rate 100cc/hr  -IS use hourly, encouraged and reviewed  -Await resolution of sepsis symptoms, return of bowel function, tolerance of diet         Interval History:   C/o soreness at incisions and RLQ area.  Resting in bed.  No gas pains/flatus.  No appetite.  Encouraged use of IS, reviewed.  Has been up to void, dark urine.  Nausea yesterday with dilaudid, so was changed to morphine.           Physical Exam:   Blood pressure 100/61, pulse 81, temperature 98.4  F (36.9  C), temperature source Oral, resp. rate 28, height 1.524 m (5'), last menstrual period 12/18/2017, SpO2 92 %.    I/O last 3 completed shifts:  In: 4030.33 [P.O.:100; I.V.:3930.33]  Out: 430 [Urine:425; Blood:5]    Abdomen:   soft, non-distended, tenderness noted diffusely and absent bowel sounds   Incs - clean, dry, steri-strips intact              Data:     Recent Labs  Lab 01/02/18  0642   CULT Heavy growthLactose fermenting gram negative rods*  Culture in progress  Critical Value/Significant Value, preliminary result only, called to and read back byJulita Kinsey RN PED 1.3.18 @0738 AV  PENDING       Recent Labs  Lab 01/02/18  1236 01/02/18  0046   WBC  --  16.7*   HGB  --  14.0   HCT  --  42.4   MCV  --  87    240       Recent Labs  Lab 01/02/18  1236  01/02/18  0046   NA  --  137   POTASSIUM  --  3.7   CHLORIDE  --  103   CO2  --  26   ANIONGAP  --  8   GLC  --  169*   BUN  --  16   CR 0.59 0.61   GFRESTIMATED >90 >90   GFRESTBLACK >90 >90   INDER  --  8.9   PROTTOTAL  --  7.8   ALBUMIN  --  3.7   BILITOTAL  --  0.8   ALKPHOS  --  105   AST  --  26   ALT  --  51*     Lauren Soliman PA-C     The patient has been seen and examined by me.  I agree with the above assessment and plan.  Earlene Dang MD

## 2018-01-04 LAB
BACTERIA SPEC CULT: ABNORMAL
SPECIMEN SOURCE: ABNORMAL

## 2018-01-04 PROCEDURE — 25000128 H RX IP 250 OP 636: Performed by: PHYSICIAN ASSISTANT

## 2018-01-04 PROCEDURE — 25000132 ZZH RX MED GY IP 250 OP 250 PS 637: Performed by: SURGERY

## 2018-01-04 PROCEDURE — 25800025 ZZH RX 258: Performed by: PHYSICIAN ASSISTANT

## 2018-01-04 PROCEDURE — 12000029 ZZH R&B OB INTERMEDIATE

## 2018-01-04 PROCEDURE — 25000128 H RX IP 250 OP 636: Performed by: SURGERY

## 2018-01-04 RX ORDER — CALCIUM CARBONATE 500 MG/1
500-1000 TABLET, CHEWABLE ORAL
Status: DISCONTINUED | OUTPATIENT
Start: 2018-01-04 | End: 2018-01-07 | Stop reason: HOSPADM

## 2018-01-04 RX ADMIN — POTASSIUM CHLORIDE, DEXTROSE MONOHYDRATE AND SODIUM CHLORIDE: 150; 5; 450 INJECTION, SOLUTION INTRAVENOUS at 10:34

## 2018-01-04 RX ADMIN — TAZOBACTAM SODIUM AND PIPERACILLIN SODIUM 3.38 G: 375; 3 INJECTION, SOLUTION INTRAVENOUS at 06:45

## 2018-01-04 RX ADMIN — CALCIUM CARBONATE (ANTACID) CHEW TAB 500 MG 1000 MG: 500 CHEW TAB at 20:28

## 2018-01-04 RX ADMIN — KETOROLAC TROMETHAMINE 15 MG: 15 INJECTION, SOLUTION INTRAMUSCULAR; INTRAVENOUS at 19:07

## 2018-01-04 RX ADMIN — SODIUM CHLORIDE 500 ML: 9 INJECTION, SOLUTION INTRAVENOUS at 09:05

## 2018-01-04 RX ADMIN — KETOROLAC TROMETHAMINE 15 MG: 15 INJECTION, SOLUTION INTRAMUSCULAR; INTRAVENOUS at 12:39

## 2018-01-04 RX ADMIN — POTASSIUM CHLORIDE, DEXTROSE MONOHYDRATE AND SODIUM CHLORIDE: 150; 5; 450 INJECTION, SOLUTION INTRAVENOUS at 22:50

## 2018-01-04 RX ADMIN — TAZOBACTAM SODIUM AND PIPERACILLIN SODIUM 3.38 G: 375; 3 INJECTION, SOLUTION INTRAVENOUS at 12:00

## 2018-01-04 RX ADMIN — ENOXAPARIN SODIUM 40 MG: 40 INJECTION SUBCUTANEOUS at 09:08

## 2018-01-04 RX ADMIN — ACETAMINOPHEN 1000 MG: 10 INJECTION, SOLUTION INTRAVENOUS at 09:08

## 2018-01-04 RX ADMIN — TAZOBACTAM SODIUM AND PIPERACILLIN SODIUM 3.38 G: 375; 3 INJECTION, SOLUTION INTRAVENOUS at 18:49

## 2018-01-04 RX ADMIN — ACETAMINOPHEN 1000 MG: 10 INJECTION, SOLUTION INTRAVENOUS at 16:34

## 2018-01-04 RX ADMIN — ACETAMINOPHEN 1000 MG: 10 INJECTION, SOLUTION INTRAVENOUS at 00:54

## 2018-01-04 RX ADMIN — KETOROLAC TROMETHAMINE 15 MG: 15 INJECTION, SOLUTION INTRAMUSCULAR; INTRAVENOUS at 00:54

## 2018-01-04 NOTE — PROGRESS NOTES
[facility]   General Surgery Progress Note           Assessment and Plan:   Assessment:   -Acute appendicitis with perforation and diffuse peritonitis  -Leukocytosis on admission due to above  -POD#2 s/p Laparoscopic appendectomy, peritoneal lavage  -Sepsis criteria met within first 24 hours of admission:  Leukocytosis, tachycardia, tachypnea; due to above  -Postoperative hypoxia requiring o2 supplementation, monitoring; currently on RA  -Expected postoperative ileus      Plan:   -start clear liquid diet slowly  -Pain management: IV morphine, scheduled ofirmev, toradol prn  -Prophylaxis:  PCDs, lovenox  -antibiotics:  IV Zosyn  -saline bolus now, continue IVF rate 100cc/hr  -IS use hourly, encouraged and reviewed         Interval History:   Afebrile.  No appetite.  + started passing flatus yesterday, with some improvement in bloating.  Up walking last night.  Voiding independently, still with dark urine today.  No BM yet.         Physical Exam:   Blood pressure 102/63, pulse 67, temperature 98  F (36.7  C), temperature source Oral, resp. rate 18, height 1.524 m (5'), last menstrual period 12/18/2017, SpO2 94 %.    I/O last 3 completed shifts:  In: 2486 [I.V.:2486]  Out: 300 [Urine:300]    Abdomen:   soft, non-distended, tenderness noted diffusely and hypoactive bowel sounds   Incs - clean, dry, steri-strips intact              Data:       Recent Labs  Lab 01/02/18  0642   CULT Heavy growthEscherichia coli*  Culture in progress  Critical Value/Significant Value, preliminary result only, called to and read back byJulita Kinsey RN PED 1.3.18 @[facility] AV  Culture negative monitoring continues       Recent Labs  Lab 01/02/18  1236 01/02/18  0046   WBC  --  16.7*   HGB  --  14.0   HCT  --  42.4   MCV  --  87    240       Recent Labs  Lab 01/02/18  1236 01/02/18  0046   NA  --  137   POTASSIUM  --  3.7   CHLORIDE  --  103   CO2  --  26   ANIONGAP  --  8   GLC  --  169*   BUN  --  16   CR 0.59 0.61    GFRESTIMATED >90 >90   GFRESTBLACK >90 >90   INDER  --  8.9   PROTTOTAL  --  7.8   ALBUMIN  --  3.7   BILITOTAL  --  0.8   ALKPHOS  --  105   AST  --  26   ALT  --  51*     Nu Randall PA-C     Seen and agree,    Chuck Summers MD  [facility]

## 2018-01-04 NOTE — PLAN OF CARE
Problem: Patient Care Overview  Goal: Plan of Care/Patient Progress Review   Ambulated in [place]ley slightly hunched over with standby assist. Used IS with encouragement. Lungs are clear. Afebrile. Rated abdominal pain as 5. Toradol and scheduled Ofirmev given and has slept all night. Bowel sounds are active and she is passing gas. No stool.  Has only urinated once 200 cc's dark vinayak colored. NPO. IV infusing at 100 cc's/hr.

## 2018-01-04 NOTE — PLAN OF CARE
Problem: Infection, Risk/Actual (Pediatric)  Goal: Identify Related Risk Factors and Signs and Symptoms  Related risk factors and signs and symptoms are identified upon initiation of Human Response Clinical Practice Guideline (CPG).   Outcome: No Change  Pt transferred from PEds unit at approx 1300. Vs taken and stable. Pt c/o pain 5/10, toradol given before transferring from peds. ivf infusing.   Plan: iv abx, toradol and orfimev for pain control.

## 2018-01-04 NOTE — PLAN OF CARE
Problem: Patient Care Overview  Goal: Individualization & Mutuality  Outcome: Declining  Decreased appetite.   Up waking in room.She will walk halls at lease 2 times this shift.  Pain managed with walking,ice pain meds.(Toradol and Ofirmev)  Will shower this afternoon.

## 2018-01-04 NOTE — PLAN OF CARE
Problem: Patient Care Overview  Goal: Plan of Care/Patient Progress Review  Outcome: No Change  VSS, afebrile. Pain managed with IV morphine PRN, IV toradol PRN, scheduled Ofirmev, and icing. Pt c/o nausea, unresponsive to Zofran but relieved with Compazine. Pt has now been sleeping comfortably since ~1830. Urine dark yellow. NPO. Bowel sounds faint and slightly hypoactive. Incision dressings clean, dry, and intact. Incentive spirometry completed independently every hour when awake. Continuing with IV Zosyn, PIV patent and infusing D5 1/2NS + 20 mEq KCl. Will continue to monitor and provide for needs.

## 2018-01-05 LAB — PLATELET # BLD AUTO: 200 10E9/L (ref 150–450)

## 2018-01-05 PROCEDURE — 36415 COLL VENOUS BLD VENIPUNCTURE: CPT | Performed by: SURGERY

## 2018-01-05 PROCEDURE — 85049 AUTOMATED PLATELET COUNT: CPT | Performed by: SURGERY

## 2018-01-05 PROCEDURE — 25000132 ZZH RX MED GY IP 250 OP 250 PS 637: Performed by: SURGERY

## 2018-01-05 PROCEDURE — 25000128 H RX IP 250 OP 636: Performed by: SURGERY

## 2018-01-05 PROCEDURE — 12000029 ZZH R&B OB INTERMEDIATE

## 2018-01-05 PROCEDURE — 25800025 ZZH RX 258: Performed by: PHYSICIAN ASSISTANT

## 2018-01-05 PROCEDURE — 25000128 H RX IP 250 OP 636: Performed by: PHYSICIAN ASSISTANT

## 2018-01-05 RX ORDER — IBUPROFEN 600 MG/1
600 TABLET, FILM COATED ORAL EVERY 6 HOURS PRN
Status: DISCONTINUED | OUTPATIENT
Start: 2018-01-05 | End: 2018-01-07 | Stop reason: HOSPADM

## 2018-01-05 RX ORDER — ALUMINA, MAGNESIA, AND SIMETHICONE 2400; 2400; 240 MG/30ML; MG/30ML; MG/30ML
30 SUSPENSION ORAL EVERY 4 HOURS PRN
Status: DISCONTINUED | OUTPATIENT
Start: 2018-01-05 | End: 2018-01-07 | Stop reason: HOSPADM

## 2018-01-05 RX ADMIN — ENOXAPARIN SODIUM 40 MG: 40 INJECTION SUBCUTANEOUS at 08:39

## 2018-01-05 RX ADMIN — ACETAMINOPHEN 1000 MG: 10 INJECTION, SOLUTION INTRAVENOUS at 01:21

## 2018-01-05 RX ADMIN — TAZOBACTAM SODIUM AND PIPERACILLIN SODIUM 3.38 G: 375; 3 INJECTION, SOLUTION INTRAVENOUS at 11:49

## 2018-01-05 RX ADMIN — ALUMINUM HYDROXIDE, MAGNESIUM HYDROXIDE, AND DIMETHICONE 30 ML: 400; 400; 40 SUSPENSION ORAL at 21:03

## 2018-01-05 RX ADMIN — CALCIUM CARBONATE (ANTACID) CHEW TAB 500 MG 1000 MG: 500 CHEW TAB at 21:55

## 2018-01-05 RX ADMIN — TAZOBACTAM SODIUM AND PIPERACILLIN SODIUM 3.38 G: 375; 3 INJECTION, SOLUTION INTRAVENOUS at 05:42

## 2018-01-05 RX ADMIN — ACETAMINOPHEN 1000 MG: 10 INJECTION, SOLUTION INTRAVENOUS at 08:39

## 2018-01-05 RX ADMIN — POTASSIUM CHLORIDE, DEXTROSE MONOHYDRATE AND SODIUM CHLORIDE: 150; 5; 450 INJECTION, SOLUTION INTRAVENOUS at 11:50

## 2018-01-05 RX ADMIN — TAZOBACTAM SODIUM AND PIPERACILLIN SODIUM 3.38 G: 375; 3 INJECTION, SOLUTION INTRAVENOUS at 00:33

## 2018-01-05 RX ADMIN — OMEPRAZOLE 20 MG: 20 CAPSULE, DELAYED RELEASE ORAL at 21:03

## 2018-01-05 RX ADMIN — KETOROLAC TROMETHAMINE 15 MG: 15 INJECTION, SOLUTION INTRAMUSCULAR; INTRAVENOUS at 08:39

## 2018-01-05 RX ADMIN — CALCIUM CARBONATE (ANTACID) CHEW TAB 500 MG 1000 MG: 500 CHEW TAB at 19:34

## 2018-01-05 RX ADMIN — ACETAMINOPHEN 650 MG: 325 TABLET, FILM COATED ORAL at 21:54

## 2018-01-05 RX ADMIN — TAZOBACTAM SODIUM AND PIPERACILLIN SODIUM 3.38 G: 375; 3 INJECTION, SOLUTION INTRAVENOUS at 18:14

## 2018-01-05 ASSESSMENT — PAIN DESCRIPTION - DESCRIPTORS: DESCRIPTORS: SORE

## 2018-01-05 NOTE — PLAN OF CARE
Problem: Infection, Risk/Actual (Pediatric)  Goal: Identify Related Risk Factors and Signs and Symptoms  Related risk factors and signs and symptoms are identified upon initiation of Human Response Clinical Practice Guideline (CPG).   Outcome: Improving  Vss afebrile. Pt c/o pain 4-5/10. Tolerable with tylenol iv and toradol.  95%ra  LS: clear  GI:bs hypo. + gas/ tolerating clear and full liq in smaller amounts. No stool today  :voiding in adequate amounts less dark in color.   Skin: lap site d/i with steri strips. Small bruise at lap site. SHALINI  Activity:up indep. Ambulating in room and hallways  Plan: encourage diet. Encourage activity. ivf at 100cc/hr. abx iv. Oral pain meds available [id number].

## 2018-01-05 NOTE — PROGRESS NOTES
[facility]   General Surgery Progress Note           Assessment and Plan:   Assessment:   -Acute appendicitis with perforation and diffuse peritonitis  -Leukocytosis on admission due to above  -POD#3 s/p Laparoscopic appendectomy, peritoneal lavage  -Sepsis criteria met within first 24 hours of admission:  Leukocytosis, tachycardia, tachypnea; due to above  -Postoperative hypoxia requiring o2 supplementation, monitoring; currently on RA  -Expected postoperative ileus      Plan:   -ADAT.  Encouraged protein shakes  -Pain management: switch to PO Tylenol and Ibuprofen.  Oxycodone available  -Prophylaxis:  PCDs, lovenox  -antibiotics:  IV Zosyn  -continue IVF  -hope to DC home tomorrow if tolerating diet.  Work note in chart.         Interval History:   Afebrile.  Avoiding narcotics.  C/o No appetite, but tolerating water so far.  + passing flatus but less than normal.  + small loose BM yesterday X 1.    Walked halls X 4 yesterday.  Voiding independently, lighter in color than yesterday.         Physical Exam:   Blood pressure 119/85, pulse 60, temperature 98.4  F (36.9  C), temperature source Oral, resp. rate 16, height 1.524 m (5'), last menstrual period 12/18/2017, SpO2 95 %.    I/O last 3 completed shifts:  In: 2022 [P.O.:540; I.V.:1482]  Out: 600 [Urine:600]    Abdomen:   soft, non-distended, tenderness noted diffusely and hypoactive bowel sounds   Incs - clean, dry, steri-strips intact              Data:       Recent Labs  Lab 01/02/18  0642   CULT Heavy growthEscherichia coli*  Heavy growthStrain 2Escherichia coli*  Light growthHaemophilus parainfluenzaeSusceptibility testing not routinely done*  Critical Value/Significant Value, preliminary result only, called to and read back byJulita Kinsey RN RHPED 1.3.18 @0003 AV  Culture negative monitoring continues       Recent Labs  Lab 01/05/18  0711 01/02/18  1236 01/02/18  0046   WBC  --   --  16.7*   HGB  --   --  14.0   HCT  --   --  42.4   MCV   --   --  87    198 240       Recent Labs  Lab 01/02/18  1236 01/02/18  0046   NA  --  137   POTASSIUM  --  3.7   CHLORIDE  --  103   CO2  --  26   ANIONGAP  --  8   GLC  --  169*   BUN  --  16   CR 0.59 0.61   GFRESTIMATED >90 >90   GFRESTBLACK >90 >90   INDER  --  8.9   PROTTOTAL  --  7.8   ALBUMIN  --  3.7   BILITOTAL  --  0.8   ALKPHOS  --  105   AST  --  26   ALT  --  51*     Nu Randall PA-C

## 2018-01-05 NOTE — PLAN OF CARE
Problem: Patient Care Overview  Goal: Plan of Care/Patient Progress Review  Outcome: Improving  [place]: A&O x4,   VSS: afebrile.   LS: clear on RA  GI: active, BM this shift- loose, +flatus  : in BR  Skin: abdominal incisions, steri strips  Activity: SBA, pt took a shower today, linen changed  Diet: clears, tolerating well   Pain: c/o 5-6/10 abdominal pain, taking scheduled ofirmev, PRN Toradol.  Plan: IV zosyn, IV fluids

## 2018-01-05 NOTE — PLAN OF CARE
Problem: Patient Care Overview  Goal: Plan of Care/Patient Progress Review  Outcome: Improving    Patient  A&O x4,  VSS, afebrile, LS clear, bowel sounds  Active, No BM this shift. Abdominal incision clean, dry and steri strips intact. SBA, taking clear liquid diet and tolerating well  Denied abdominal pain this shift but continues on  Scheduled Ofirmev and toradol RPN

## 2018-01-06 ENCOUNTER — ANESTHESIA EVENT (OUTPATIENT)
Dept: OBGYN | Facility: CLINIC | Age: 28
End: 2018-01-06
Payer: COMMERCIAL

## 2018-01-06 ENCOUNTER — ANESTHESIA (OUTPATIENT)
Dept: OBGYN | Facility: CLINIC | Age: 28
End: 2018-01-06
Payer: COMMERCIAL

## 2018-01-06 LAB
BASOPHILS # BLD AUTO: 0 10E9/L (ref 0–0.2)
BASOPHILS NFR BLD AUTO: 0.5 %
DIFFERENTIAL METHOD BLD: ABNORMAL
EOSINOPHIL # BLD AUTO: 0.2 10E9/L (ref 0–0.7)
EOSINOPHIL NFR BLD AUTO: 2.4 %
ERYTHROCYTE [DISTWIDTH] IN BLOOD BY AUTOMATED COUNT: 11.9 % (ref 10–15)
HCT VFR BLD AUTO: 31.3 % (ref 35–47)
HGB BLD-MCNC: 10.8 G/DL (ref 11.7–15.7)
IMM GRANULOCYTES # BLD: 0 10E9/L (ref 0–0.4)
IMM GRANULOCYTES NFR BLD: 0.5 %
LYMPHOCYTES # BLD AUTO: 2.1 10E9/L (ref 0.8–5.3)
LYMPHOCYTES NFR BLD AUTO: 27.3 %
MCH RBC QN AUTO: 29.5 PG (ref 26.5–33)
MCHC RBC AUTO-ENTMCNC: 34.5 G/DL (ref 31.5–36.5)
MCV RBC AUTO: 86 FL (ref 78–100)
MONOCYTES # BLD AUTO: 0.5 10E9/L (ref 0–1.3)
MONOCYTES NFR BLD AUTO: 6.4 %
NEUTROPHILS # BLD AUTO: 4.7 10E9/L (ref 1.6–8.3)
NEUTROPHILS NFR BLD AUTO: 62.9 %
NRBC # BLD AUTO: 0 10*3/UL
NRBC BLD AUTO-RTO: 0 /100
PLATELET # BLD AUTO: 235 10E9/L (ref 150–450)
RBC # BLD AUTO: 3.66 10E12/L (ref 3.8–5.2)
WBC # BLD AUTO: 7.5 10E9/L (ref 4–11)

## 2018-01-06 PROCEDURE — 25000128 H RX IP 250 OP 636: Performed by: PHYSICIAN ASSISTANT

## 2018-01-06 PROCEDURE — 40000671 ZZH STATISTIC ANESTHESIA CASE

## 2018-01-06 PROCEDURE — 85025 COMPLETE CBC W/AUTO DIFF WBC: CPT | Performed by: PHYSICIAN ASSISTANT

## 2018-01-06 PROCEDURE — 25000132 ZZH RX MED GY IP 250 OP 250 PS 637: Performed by: SURGERY

## 2018-01-06 PROCEDURE — 12000029 ZZH R&B OB INTERMEDIATE

## 2018-01-06 PROCEDURE — 37000011 ZZH ANESTHESIA WARD SERVICE

## 2018-01-06 PROCEDURE — 25000128 H RX IP 250 OP 636: Performed by: SURGERY

## 2018-01-06 PROCEDURE — 25800025 ZZH RX 258: Performed by: PHYSICIAN ASSISTANT

## 2018-01-06 PROCEDURE — 25000125 ZZHC RX 250

## 2018-01-06 PROCEDURE — 36416 COLLJ CAPILLARY BLOOD SPEC: CPT | Performed by: PHYSICIAN ASSISTANT

## 2018-01-06 RX ORDER — METRONIDAZOLE 500 MG/1
500 TABLET ORAL EVERY 8 HOURS SCHEDULED
Status: DISCONTINUED | OUTPATIENT
Start: 2018-01-06 | End: 2018-01-07 | Stop reason: HOSPADM

## 2018-01-06 RX ORDER — LIDOCAINE HYDROCHLORIDE 10 MG/ML
INJECTION, SOLUTION EPIDURAL; INFILTRATION; INTRACAUDAL; PERINEURAL
Status: DISPENSED
Start: 2018-01-06 | End: 2018-01-07

## 2018-01-06 RX ORDER — CIPROFLOXACIN 2 MG/ML
400 INJECTION, SOLUTION INTRAVENOUS EVERY 12 HOURS
Status: DISCONTINUED | OUTPATIENT
Start: 2018-01-06 | End: 2018-01-06

## 2018-01-06 RX ORDER — CIPROFLOXACIN 250 MG/1
250 TABLET, FILM COATED ORAL EVERY 12 HOURS SCHEDULED
Status: DISCONTINUED | OUTPATIENT
Start: 2018-01-07 | End: 2018-01-07 | Stop reason: HOSPADM

## 2018-01-06 RX ORDER — LIDOCAINE 40 MG/G
CREAM TOPICAL
Status: DISCONTINUED | OUTPATIENT
Start: 2018-01-06 | End: 2018-01-06

## 2018-01-06 RX ORDER — LIDOCAINE 40 MG/G
CREAM TOPICAL
Status: COMPLETED
Start: 2018-01-06 | End: 2018-01-06

## 2018-01-06 RX ADMIN — Medication 2 LOZENGE: at 10:05

## 2018-01-06 RX ADMIN — SALINE NASAL SPRAY 2 SPRAY: 1.5 SOLUTION NASAL at 10:44

## 2018-01-06 RX ADMIN — POTASSIUM CHLORIDE, DEXTROSE MONOHYDRATE AND SODIUM CHLORIDE: 150; 5; 450 INJECTION, SOLUTION INTRAVENOUS at 00:19

## 2018-01-06 RX ADMIN — ACETAMINOPHEN 650 MG: 325 TABLET, FILM COATED ORAL at 09:16

## 2018-01-06 RX ADMIN — OMEPRAZOLE 20 MG: 20 CAPSULE, DELAYED RELEASE ORAL at 09:04

## 2018-01-06 RX ADMIN — TAZOBACTAM SODIUM AND PIPERACILLIN SODIUM 3.38 G: 375; 3 INJECTION, SOLUTION INTRAVENOUS at 06:17

## 2018-01-06 RX ADMIN — METRONIDAZOLE 500 MG: 500 TABLET ORAL at 22:01

## 2018-01-06 RX ADMIN — ACETAMINOPHEN 650 MG: 325 TABLET, FILM COATED ORAL at 02:23

## 2018-01-06 RX ADMIN — LIDOCAINE: 40 CREAM TOPICAL at 16:03

## 2018-01-06 RX ADMIN — POTASSIUM CHLORIDE, DEXTROSE MONOHYDRATE AND SODIUM CHLORIDE: 150; 5; 450 INJECTION, SOLUTION INTRAVENOUS at 12:11

## 2018-01-06 RX ADMIN — CIPROFLOXACIN 400 MG: 2 INJECTION, SOLUTION INTRAVENOUS at 17:15

## 2018-01-06 RX ADMIN — TAZOBACTAM SODIUM AND PIPERACILLIN SODIUM 3.38 G: 375; 3 INJECTION, SOLUTION INTRAVENOUS at 00:26

## 2018-01-06 RX ADMIN — ENOXAPARIN SODIUM 40 MG: 40 INJECTION SUBCUTANEOUS at 09:04

## 2018-01-06 RX ADMIN — TAZOBACTAM SODIUM AND PIPERACILLIN SODIUM 3.38 G: 375; 3 INJECTION, SOLUTION INTRAVENOUS at 12:11

## 2018-01-06 NOTE — PLAN OF CARE
Problem: Infection, Risk/Actual (Pediatric)  Goal: Identify Related Risk Factors and Signs and Symptoms  Related risk factors and signs and symptoms are identified upon initiation of Human Response Clinical Practice Guideline (CPG).   Outcome: Improving  Peripheral IV got infiltrated, holding antibiotics, ice pack to sight and elevated, pt stated a relief and taking shower now; informed L and D RN Cathi Hawthorne for restarting new IV, informed charge nurse as well; changed fresh linens, continue to monitor.

## 2018-01-06 NOTE — PLAN OF CARE
Problem: Patient Care Overview  Goal: Plan of Care/Patient Progress Review  Outcome: Improving  Patient is up and steady on feet, voiding clear yellow urine without difficulty. No nausea, passing flatus, states previous bowel movement which was loose. Afebrile and IVF as per order. Tolerated popsicle this am and not ready to order, patient aware that protein shakes and electrolyte fluids available. Cepacol for dry throat and ocean spray for dry nostrils. Encouraged ambulation and fluids.

## 2018-01-06 NOTE — PHARMACY-ADMISSION MEDICATION HISTORY
Admission medication history interview status for this patient is complete. See [facility] admission navigator for allergy information, prior to admission medications and immunization status.     Medication history interview source(s):Patient  Medication history resources (including written lists, pill bottles, clinic record):None  [facility]    Changes made to PTA medication list:  Added: Fish oil, Multivitamin  Deleted: None  Changed: None    Actions taken by pharmacist (provider contacted, etc):None     Additional medication history information:Patient takes OTC fish oil and Women's multivitamin. Patient does not know the strength of the Fish oil. Patient does not take any other herbals or supplements.    Medication reconciliation/reorder completed by provider prior to medication history? No    Do you take OTC medications (eg tylenol, ibuprofen, fish oil, eye/ear drops, etc)? Y(Y/N)    For patients on insulin therapy: N (Y/N)        Prior to Admission medications    Medication Sig Last Dose Taking? Auth Provider   Omega-3 Fatty Acids (FISH OIL PO) Take 2 capsules by mouth daily 1/1/2018 at na Yes Unknown, Entered By History   Multiple Vitamins-Minerals (WOMENS MULTIVITAMIN PO) Take 1 tablet by mouth daily 1/1/2018 at na Yes Unknown, Entered By History

## 2018-01-06 NOTE — PLAN OF CARE
Problem: Patient Care Overview  Goal: Plan of Care/Patient Progress Review  Outcome: No Change  Patient is now complaining of left eye irritation. No redness to eye or yellowish discharge or crusty sediment noted, but eye is watering. Some noted swelling under left eye and patient states that the skin around her eye feels like it is burning. Writer gave patient cold wet washcloth and ice pack and lights are dim. Writer will also be giving patient her second dose of Tums and 650mg of Tylenol.

## 2018-01-06 NOTE — PLAN OF CARE
Problem: Patient Care Overview  Goal: Plan of Care/Patient Progress Review  Outcome: Improving  Pt AVSS. Pain is controlled with Ibuprofen or Tylenol. Ambulating. Tolerating full liquid diet. Abd lap sites CDI. Voiding, passing flatus. Denies N/V. Pt lost her IV access, L/D nurse tried  getting a new one did not work, anesthesiologist had to start an IV on pt, did it successfully. Fluids and antibiotics are infusing were infusing until the IV access loss. IV medications got switched to PO .Pt is tolerating them well. DC is planned for tomorrow.

## 2018-01-06 NOTE — PLAN OF CARE
Problem: Patient Care Overview  Goal: Plan of Care/Patient Progress Review  Outcome: Improving  VSS. Using incentive spirometer and TCDB per patient; taking shallow breaths but O2 sat >94% while resting. Clear liquid diet, only drank water and ice chips overnight. Bowel sounds present. Experiencing abdominal pain which was controlled with use of Tylenol and rest; patient educated on available pain control options and declined other medications. Per patient acid reflux and eye discomfort from previous shift are resolved, declined use of medications and ice. Able to rest throughout night. Incision is free of signs of infection, steristrips in place.Voiding clear yellow urine independently, able to ambulate in room ad lina. IV antibiotics infused as prescribed.

## 2018-01-06 NOTE — PROGRESS NOTES
[facility]   General Surgery Progress Note           Assessment and Plan:   Assessment:   POD#4 s/p Laparoscopic appendectomy and peritoneal lavage for acute perforated appendicitis  -Sepsis criteria met within first 24 hours of admission:  Leukocytosis, tachycardia, tachypnea; due to above  -Postoperative hypoxia requiring o2 supplementation, monitoring; currently on RA  -Expected postoperative ileus      Plan:   -ADAT.  Encouraged protein shakes  -Pain management: switch to PO Tylenol and Ibuprofen.  Oxycodone available  -Prophylaxis:  PCDs, lovenox  -antibiotics:  IV Zosyn  -continue IVF  -hope to DC home tomorrow if tolerating diet.  Work note in chart.         Interval History:   Resting in bed. Afebrile. Pain mostly controlled with tylenol. Had another loose BM this morning. Did not tolerate full liquids last night and was placed back on clears. Doing fine with clears this morning but not much of an appetite. Up walking to restroom.          Physical Exam:   Blood pressure (!) 129/91, pulse 75, temperature 97.9  F (36.6  C), temperature source Oral, resp. rate 18, height 1.524 m (5'), weight 88 kg (194 lb 0.1 oz), last menstrual period 12/18/2017, SpO2 100 %.    I/O last 3 completed shifts:  In: 3510 [P.O.:1520; I.V.:1990]  Out: 1050 [Urine:1050]    Abdomen:   soft, non-distended, +tenderness in RLQ and hypoactive bowel sounds   Incs - clean, dry, steri-strips intact              Data:       Recent Labs  Lab 01/02/18  0642   CULT Heavy growthBacteroides fragilisSusceptibility testing not routinely done*  Culture in progress  Heavy growthEscherichia coli*  Heavy growthStrain 2Escherichia coli*  Light growthHaemophilus parainfluenzaeSusceptibility testing not routinely done*  Critical Value/Significant Value, preliminary result only, called to and read back byJulita Kinsey RN RHPED 1.3.18 @07 AV       Recent Labs  Lab 01/05/18  0711 01/02/18  1236 01/02/18  0046   WBC  --   --  16.7*   HGB   --   --  14.0   HCT  --   --  42.4   MCV  --   --  87    198 240       Recent Labs  Lab 01/02/18  1236 01/02/18  0046   NA  --  137   POTASSIUM  --  3.7   CHLORIDE  --  103   CO2  --  26   ANIONGAP  --  8   GLC  --  169*   BUN  --  16   CR 0.59 0.61   GFRESTIMATED >90 >90   GFRESTBLACK >90 >90   INDER  --  8.9   PROTTOTAL  --  7.8   ALBUMIN  --  3.7   BILITOTAL  --  0.8   ALKPHOS  --  105   AST  --  26   ALT  --  51*       Latrell Gaona PA-C   Seen and agree.  Ileus is primary issue, afebrile.  Luly Crouch MD

## 2018-01-06 NOTE — PROGRESS NOTES
General Surgery Progress Note:  Updated plan:   Culture and sensitivities now available. See lab results. Will DC Zosyn and switch to Cirpo and Flagyl.    Latrell Gaona PA-C

## 2018-01-06 NOTE — PLAN OF CARE
Problem: Patient Care Overview  Goal: Plan of Care/Patient Progress Review  Outcome: Improving  VSS. Denies pain. Incisions (3) over abdomen are with steri strips. No redness, drainage or signs of infection noted.  Calm, hopeful mood. Voiding clear yellow urine without difficulty. Denies pain and is aware that pain meds are available.  IV Zosyn infusing.  Using inspirometer on own, and encouraged fluids and walking.  Calm and hopeful mood.  Family supportive and at bedside.

## 2018-01-06 NOTE — PROVIDER NOTIFICATION
01/05/18 2040   Provider Notification   Provider Name/Title Dr. Montejo   Method of Notification Phone   Request Evaluate-Remote   Notification Reason Status Update   Patient complaining of severe acid reflux.  Continues with normal active bowel sounds and had BM today, passing gas. Taking in full liquids currently.  Dr. Montejo gave orders for patient to: return to clear liquid diet with sips of water and ice chips.  Continue with IV fluids, and may have Maalox PRN and Omeprazole daily. These meds were given as were the Tums previously.

## 2018-01-06 NOTE — ANESTHESIA POSTPROCEDURE EVALUATION
Patient: Sonya Alvarado    * No procedures listed *    Diagnosis:* No pre-op diagnosis entered *  Diagnosis Additional Information: Difficult IV   Dr Jean    Anesthesia Type:  Other    Note:  Anesthesia Post Evaluation    Patient location during evaluation: PACU  Patient participation: Able to fully participate in evaluation  Level of consciousness: awake  Pain management: adequate  Airway patency: patent  Cardiovascular status: acceptable  Respiratory status: acceptable  Hydration status: acceptable  PONV: none     Anesthetic complications: None          Last vitals:  Vitals:    01/06/18 0134 01/06/18 0916 01/06/18 1200   BP:  (!) 129/91 112/75   Pulse:      Resp:  18    Temp:  97.9  F (36.6  C) 98.9  F (37.2  C)   SpO2: 94% 100%          Electronically Signed By: Manny Jean MD  January 6, 2018  5:18 PM

## 2018-01-07 VITALS
HEART RATE: 61 BPM | BODY MASS INDEX: 38.09 KG/M2 | SYSTOLIC BLOOD PRESSURE: 124 MMHG | TEMPERATURE: 97.7 F | RESPIRATION RATE: 18 BRPM | DIASTOLIC BLOOD PRESSURE: 76 MMHG | HEIGHT: 60 IN | OXYGEN SATURATION: 100 % | WEIGHT: 194 LBS

## 2018-01-07 PROCEDURE — 25000132 ZZH RX MED GY IP 250 OP 250 PS 637: Performed by: SURGERY

## 2018-01-07 PROCEDURE — 25000128 H RX IP 250 OP 636: Performed by: SURGERY

## 2018-01-07 RX ORDER — CIPROFLOXACIN 250 MG/1
250 TABLET, FILM COATED ORAL EVERY 12 HOURS
Qty: 14 TABLET | Refills: 0 | Status: SHIPPED | OUTPATIENT
Start: 2018-01-07 | End: 2018-01-14

## 2018-01-07 RX ORDER — METRONIDAZOLE 500 MG/1
500 TABLET ORAL EVERY 8 HOURS
Qty: 21 TABLET | Refills: 0 | Status: SHIPPED | OUTPATIENT
Start: 2018-01-07 | End: 2018-01-14

## 2018-01-07 RX ADMIN — CIPROFLOXACIN HYDROCHLORIDE 250 MG: 250 TABLET, FILM COATED ORAL at 06:06

## 2018-01-07 RX ADMIN — ENOXAPARIN SODIUM 40 MG: 40 INJECTION SUBCUTANEOUS at 07:59

## 2018-01-07 RX ADMIN — METRONIDAZOLE 500 MG: 500 TABLET ORAL at 06:05

## 2018-01-07 RX ADMIN — OMEPRAZOLE 20 MG: 20 CAPSULE, DELAYED RELEASE ORAL at 07:59

## 2018-01-07 RX ADMIN — ACETAMINOPHEN 650 MG: 325 TABLET, FILM COATED ORAL at 03:52

## 2018-01-07 NOTE — PLAN OF CARE
Problem: Patient Care Overview  Goal: Plan of Care/Patient Progress Review  Outcome: Improving   Pt remains afebrile and vitals are within normal limits. Pt is up ad lina and is ambulating and voiding without difficulties. Tylenol given for mild headache, relief reported. Pt is tolerating regular diet and continues to receive Cipro and Flagyl antibiotics as scheduled. Incision site is clean and intact.  Anticipated discharge to home today.

## 2018-01-07 NOTE — DISCHARGE INSTRUCTIONS
HOME CARE FOLLOWING APPENDECTOMY  JAVON Frazier E. Gavin, N. Guttormson, D. Maurer, NEAL Varner, MATTHEW Greenwood    INCISIONAL CARE:  Replace the bandage over your incision (or incisions) until all drainage stops, or if more comfortable to have in place.  If present, leave the steri-strips (white paper tapes) in place for 14 days after surgery.  If you have staples in your incision at the time of discharge, they will be removed at your follow-up appointment.  If Dermabond (a type of skin glue) is present, leave in place until it wears/flakes off.     BATHING:  Avoid baths for 1 week after surgery.  Showers are okay.  You may wash your hair at any time.  Gently pat your incision dry after bathing.    ACTIVITY:  Light Activity -- you may immediately be up and about as tolerated.  Driving -- you may drive when comfortable and off narcotic pain medications.  Light Work -- resume when comfortable off pain medications.  (If you can drive, you probably can work.)  Strenuous Work/Activity -- limit lifting to 20 pounds for 1 week.  Progressively increase with time.  Active Sports (running, biking, etc.) -- cautiously resume after 2 weeks.    DISCOMFORT:  Use pain medications as prescribed by your surgeon.  Take the pain medication with some food, when possible, to minimize side effects.  Intermittent use of ice packs at the incision sites may help during the first 48 hours.  Expect gradual improvement.    DIET:  No restrictions.  Drink plenty of fluids.  While taking pain medications, increase dietary fiber or add a fiber supplementation like Metamucil or Citrucel to help prevent constipation - a possible side effect of pain medications.    NAUSEA:  If nauseated from the anesthetic/pain meds; rest in bed, get up cautiously with assistance, and drink clear liquids (juice, tea, broth).    RETURN APPOINTMENT:  Schedule a follow-up visit 2-3 weeks post-op.  Office Phone:  [phone]     CONTACT US IF THE FOLLOWING  DEVELOPS:   1. A fever that is above 101     2. If there is a large amount of drainage, bleeding, or swelling.   3. Severe pain that is not relieved by your prescription.   4. Drainage that is thick, cloudy, yellow, green or white.   5. Any other questions not answered by  Frequently Asked Questions  sheet.      FREQUENTLY ASKED QUESTIONS:    Q:  How should my incision look?    A:  Normally your incision will appear slightly swollen with light redness directly along the incision itself as it heals.  It may feel like a bump or ridge as the healing/scarring happens, and over time (3-4 months) this bump or ridge feeling should slowly go away.  In general, clear or pink watery drainage can be normal at first as your incision heals, but should decrease over time.    Q:  How do I know if my incision is infected?  A:  Look at your incision for signs of infection, like redness around the incision spreading to surrounding skin, or drainage of cloudy or foul-smelling drainage.  If you feel warm, check your temperature to see if you are running a fever.    **If any of these things occur, please notify the nurse at our office.  We may need you to come into the office for an incision check.      Q:  How do I take care of my incision?  A:  If you have a dressing in place - Starting the day after surgery, replace the dressing 1-2 times a day until there is no further drainage from the incision.  At that time, a dressing is no longer needed.  Try to minimize tape on the skin if irritation is occurring at the tape sites.  If you have significant irritation from tape on the skin, please call the office to discuss other method of dressing your incision.    Small pieces of tape called  steri-strips  may be present directly overlying your incision; these may be removed 10 days after surgery unless otherwise specified by your surgeon.  If these tapes start to loosen at the ends, you may trim them back until they fall off or are removed.     A:  If you had  Dermabond  tissue glue used as a dressing (this causes your incision to look shiny with a clear covering over it) - This type of dressing wears off with time and does not require more dressings over the top unless it is draining around the glue as it wears off.  Do not apply ointments or lotions over the incisions until the glue has completely worn off.    Q:  There is a piece of tape or a sticky  lead  still on my skin.  Can I remove this?  A:  Sometimes the sticky  leads  used for monitoring during surgery or for evaluation in the emergency department are not all removed while you are in the hospital.  These sometimes have a tab or metal dot on them.  You can easily remove these on your own, like taking off a band-aid.  If there is a gel substance under the  lead , simply wipe/clean it off with a washcloth or paper towel.      Q:  What can I do to minimize constipation (very hard stools, or lack of stools)?  A:  Stay well hydrated.  Increase your dietary fiber intake or take a fiber supplement -with plenty of water.  Walk around frequently.  You may consider an over-the-counter stool-softener.  Your Pharmacist can assist you with choosing one that is stocked at your pharmacy.  Constipation is also one of the most common side effects of pain medication.  If you are using pain medication, be pro-active and try to PREVENT problems with constipation by taking the steps above BEFORE constipation becomes a problem.    Q:  What do I do if I need more pain medications?  A:  Call the office to receive refills.  Be aware that certain pain meds cannot be called into a pharmacy and actually require a paper prescription.  A change may be made in your pain med as you progress thru your recovery period or if you have side effects to certain meds.    --Pain meds are NOT refilled after 5pm on weekdays, and NOT AT ALL on the weekends, so please look ahead to prevent problems.      Q:  Why am I having a hard time  sleeping now that I am at home?  A:  Many medications you receive while you are in the hospital can impact your sleep for a number of days after your surgery/hospitalization.  Decreased level of activity and naps during the day may also make sleeping at night difficult.  Try to minimize day-time naps, and get up frequently during the day to walk around your home during your recovery time.  Sleep aides may be of some help, but are not recommended for long-term use.      Q:  I am having some back discomfort.  What should I do?  A:  This may be related to certain positioning that was required for your surgery, extended periods of time in bed, or other changes in your overall activity level.  You may try ice, heat, acetaminophen, or ibuprofen to treat this temporarily.  Note that many pain medications have acetaminophen in them and would state this on the prescription bottle.  Be sure not to exceed the maximum of 4000mg per day of acetaminophen.     **If the pain you are having does not resolve, is severe, or is a flare of back pain you have had on other occasions prior to surgery, please contact your primary physician for further recommendations or for an appointment to be examined at their office.    Q:  Why am I having headaches?  A:  Headaches can be caused by many things:  caffeine withdrawal, use of pain meds, dehydration, high blood pressure, lack of sleep, over-activity/exhaustion, flare-up of usual migraine headaches.  If you feel this is related to muscle tension (a band-like feeling around the head, or a pressure at the low-back of the head) you may try ice or heat to this area.  You may need to drink more fluids (try electrolyte drink like Gatorade), rest, or take your usual migraine medications.   **If your headaches do not resolve, worsen, are accompanied by other symptoms, or if your blood pressure is high, please call your primary physician for recommendation and/or examination.    Q:  I am unable to  urinate.  What do I do?  A:  A small percentage of people can have difficulty urinating initially after surgery.  This includes being able to urinate only a very small amount at a time and feeling discomfort or pressure in the very low abdomen.  This is called  urinary retention , and is actually an urgent situation.  Proceed to your nearest Emergency department for evaluation (not an Urgent Care Center).  Sometimes the bladder does not work correctly after certain medications you receive during surgery, or related to certain procedures.  You may need to have a catheter placed until your bladder recovers.  When planning to go to an Emergency department, it may help to call the ER to let them know you are coming in for this problem after a surgery.  This may help you get in quicker to be evaluated.  **If you have symptoms of a urinary tract infection, please contact your primary physician for the proper evaluation and treatment.          If you have other questions, please call the office Monday thru Friday between 8am and 5pm to discuss with the nurse or physician assistant.  #[phone]    There is a surgeon ON CALL on weekday evenings and over the weekend in case of urgent need only, and may be contacted at the same number.    If you are having an emergency, call 911 or proceed to your nearest emergency department.

## 2018-01-07 NOTE — PROGRESS NOTES
[facility]   General Surgery Progress Note           Assessment and Plan:   Assessment:   POD#5 s/p Laparoscopic appendectomy and peritoneal lavage for acute perforated appendicitis  -Sepsis criteria met within first 24 hours of admission:  Leukocytosis, tachycardia, tachypnea; due to above  -Expected postoperative ileus - resolved      Plan:   -Regular diet this morning  -Pain management:  PO Tylenol and Ibuprofen  -Prophylaxis:  PCDs, lovenox  -antibiotics:  cipro/flagyl  -Plan to DC today. Rx: cipro/flagyl. Patient does not want narcotics. RTC 1-3 weeks. DC instructions reviewed and are in chart.         Interval History:   Resting in bed. Afebrile. States she feels much better today and has more energy. Denies pain. Up walking several times yesterday. Tolerating full liquids. Plan to eat regular breakfast now. Voiding independently. Having loose BMs. Wants to go home.         Physical Exam:   Blood pressure 124/76, pulse 61, temperature 97.7  F (36.5  C), temperature source Oral, resp. rate 18, height 1.524 m (5'), weight 88 kg (194 lb 0.1 oz), last menstrual period 12/18/2017, SpO2 100 %.    I/O last 3 completed shifts:  In: 2054 [P.O.:828; I.V.:1226]  Out: 375 [Urine:375]    Abdomen:   soft, non-distended, non-tender and active bowel sounds   Incs - clean, dry, steri-strips intact              Data:       Recent Labs  Lab 01/02/18  0642   CULT Heavy growthBacteroides fragilisSusceptibility testing not routinely done*  Moderate growthVeillonella speciesSusceptibility testing not routinely done*  Culture in progress  Heavy growthEscherichia coli*  Heavy growthStrain 2Escherichia coli*  Light growthHaemophilus parainfluenzaeSusceptibility testing not routinely done*  Critical Value/Significant Value, preliminary result only, called to and read back byJulita Kinsey RN [facility] 1.3.18 @0738        Recent Labs  Lab 01/06/18  1202 01/05/18  0711 01/02/18  1236 01/02/18  0046   WBC 7.5  --   --   16.7*   HGB 10.8*  --   --  14.0   HCT 31.3*  --   --  42.4   MCV 86  --   --  87    200 198 240       Recent Labs  Lab 01/02/18  1236 01/02/18  0046   NA  --  137   POTASSIUM  --  3.7   CHLORIDE  --  103   CO2  --  26   ANIONGAP  --  8   GLC  --  169*   BUN  --  16   CR 0.59 0.61   GFRESTIMATED >90 >90   GFRESTBLACK >90 >90   INDER  --  8.9   PROTTOTAL  --  7.8   ALBUMIN  --  3.7   BILITOTAL  --  0.8   ALKPHOS  --  105   AST  --  26   ALT  --  51*       Latrell Gaona PA-C

## 2018-01-07 NOTE — PLAN OF CARE
Pt lost another IV access after running fluids for couple hours. Called  on call, spoke with Dr. Crouch to change the IV antibiotics to PO. Dr. Crouch approved it and gave telephone order to change the Cipro IV to Cipro 250 mg BID PO, and Flagyl IV to Flagyl 500 TID PO. The orders were activated waiting on pharmacy to confirm.  also gave orders to DC the IV fluids. IV was taken out.

## 2018-01-07 NOTE — PLAN OF CARE
Problem: Patient Care Overview  Goal: Plan of Care/Patient Progress Review  Outcome: Adequate for Discharge Date Met: 01/07/18  VSS, ambulated well this shift, declined pain meds, went over discharge education with pt and family, explained expectations and follow up with [facility] clinic, Dr Summers, given filled Rxs for Cipro and Flagyl and explained, will be discharging shortly to home this afternoon in stable condition.

## 2018-01-11 LAB
BACTERIA SPEC CULT: ABNORMAL
Lab: ABNORMAL
SPECIMEN SOURCE: ABNORMAL

## 2018-01-12 NOTE — DISCHARGE SUMMARY
[facility]    Discharge Summary  Surgery    Date of Admission:  1/2/2018  Date of Discharge:  1/7/2018  Discharging Provider: Latrell Gaona PA-C  Discharge Summary Note completed by: Jodi Darling PA-C on 1/12/2018  Date of Service: The patient was personally seen by Discharging Providers on the day of discharge.    Discharge Diagnoses   Active Problems:    Acute fulminating appendicitis with perforation and peritonitis    Sepsis       Procedure/Surgery Information   Procedure(s):  LAPAROSCOPIC APPENDECTOMY   - Wound Class: IV-Dirty or Infected   Surgeon(s) and Role:     * Chuck Summers MD - Primary     Specimens:   ID Type Source Tests Collected by Time Destination   1 :  Fluid Peritoneum ANAEROBIC BACTERIAL CULTURE, FLUID CULTURE AEROBIC BACTERIAL, GRAM STAIN Chuck Summers MD 1/2/2018  6:42 AM    A : appendix Tissue Appendix SURGICAL PATHOLOGY EXAM Chuck Summers MD 1/2/2018  7:11 AM       Non-operative procedures: None performed       History of Present Illness   This is a 27-year-old woman who presented with a less than 12 hour history of lower abdominal pain.  She presented to the emergency room in a significant degree of pain, and CT scan and ultrasound showed findings concerning for possible appendicitis.  The CT scan, however, showed rather diffuse inflammatory change, including thickening of the small bowel.  It raised the question of colitis as well.  Laparoscopic appendectomy was recommended with the understanding that there was a greater than usual chance that there might be some other cause for the inflammation.  We discussed the procedure, along with its risks and complications, in detail.  The patient agreed to proceed.    Hospital Course   Sonya Alvarado was admitted on 1/2/2018.  The following problems were addressed during her hospitalization:  Patient Active Problem List   Diagnosis     Acute fulminating appendicitis with perforation and peritonitis        Post-operative antibiotic therapy included: Zosyn, ciprofloxacin, flagyl.  Post-operative pain control: was via IV until able to tolerate PO intake and transitioned to PO pain meds.    Remarkable hospital course events: The patient met sepsis criteria without 24 hours of admission. She had a postoperative ileus, as expected, which later resolved. The required oxygen for postoperative hypoxia, which later resolved.   Please see Hospitalist notes for further details if needed. Intraoperative fluid cultures revealed heavy growth bacteroides fragilis, veillonella species, campylobacter ureolyticus.   Sonya met all criteria for release on 1/7/2018.  She was afebrile, tolerating diet, pain controlled on PO meds, ambulating well, and had return of bowel function.    Medications discontinued or adjusted during this hospitalization: see discharge med list below.    Antibiotics prescribed at discharge: Ciprofloxacin, Flagyl, Duration: one week    Imaging study follow up needs:   -None performed    Discharge Instructions and Follow-Up:  Discharge diet: Regular   Discharge activity: No heavy lifting, pushing, pulling for 3 week(s)   Discharge follow-up: Follow up with Dr. felix in 1-3 weeks   Wound/Incision care: Keep wound clean and dry       Jodi Darling PA-C      Discharge Disposition   Discharged to home   Condition at discharge: Stable    Pending Results   Final pathology results:   Acute appendicitis  Unresulted Labs Ordered in the Past 30 Days of this Admission     No orders found from 11/3/2017 to 1/3/2018.          Primary Care Physician   [facility]    Consultations This Hospital Stay   None    Discharge Orders   No discharge procedures on file.  Discharge Medications   Discharge Medication List as of 1/7/2018  9:35 AM      START taking these medications    Details   ciprofloxacin (CIPRO) 250 MG tablet Take 1 tablet (250 mg) by mouth every 12 hours for 7 days, Disp-14 tablet,  R-0, E-Prescribe      metroNIDAZOLE (FLAGYL) 500 MG tablet Take 1 tablet (500 mg) by mouth every 8 hours for 7 days, Disp-21 tablet, R-0, E-Prescribe         CONTINUE these medications which have NOT CHANGED    Details   Omega-3 Fatty Acids (FISH OIL PO) Take 2 capsules by mouth daily, Historical         STOP taking these medications       Multiple Vitamins-Minerals (WOMENS MULTIVITAMIN PO) Comments:   Reason for Stopping:             Allergies   No Known Allergies  Data   Most Recent 3 CBC's:  Recent Labs   Lab Test  01/06/18   1202  01/05/18   0711  01/02/18   1236  01/02/18   0046   WBC  7.5   --    --   16.7*   HGB  10.8*   --    --   14.0   MCV  86   --    --   87   PLT  235  200  198  240      Most Recent 3 BMP's:  Recent Labs   Lab Test  01/02/18   1236  01/02/18   0046   NA   --   137   POTASSIUM   --   3.7   CHLORIDE   --   103   CO2   --   26   BUN   --   16   CR  0.59  0.61   ANIONGAP   --   8   INDER   --   8.9   GLC   --   169*     Most Recent 2 LFT's:  Recent Labs   Lab Test  01/02/18   0046   AST  26   ALT  51*   ALKPHOS  105   BILITOTAL  0.8     Most Recent INR's and Anticoagulation Dosing History:  Anticoagulation Dose History     There is no flowsheet data to display.        Most Recent 3 Troponin's:No lab results found.  Most Recent Cholesterol Panel:No lab results found.  Most Recent 6 Bacteria Isolates From Any Culture (See EPIC Reports for Culture Details):  Recent Labs   Lab Test  01/02/18   0642   CULT  Heavy growth  Bacteroides fragilis  Susceptibility testing not routinely done  *  Moderate growth  Veillonella species  Susceptibility testing not routinely done  *  Heavy growth  Campylobacter ureolyticus  Susceptibility testing not routinely done  *  Since this specimen was not transported in the proper anaerobic transport media, the   isolated anaerobes may not represent the total number present.    Heavy growth  Escherichia coli  *  Heavy growth  Strain 2  Escherichia coli  *  Light  growth  Haemophilus parainfluenzae  Susceptibility testing not routinely done  *  Critical Value/Significant Value, preliminary result only, called to and read back by  Julita Kinsey RN [facility] 1.3.18 @7917        Most Recent TSH, T4 and A1c Labs:No lab results found.  Results for orders placed or performed during the hospital encounter of 01/02/18   US Pel W/Trans*    Narrative    US PELVIC COMPLETE WITH TRANSVAGINAL  1/2/2018 2:18 AM     INDICATION: Abdominal pain.     COMPARISON: None.    FINDINGS: Transabdominal followed by endovaginal ultrasound to better  evaluate ovaries. Uterus measures 7.8 x 3.4 x 4.3 cm. Endometrial  stripe measures 0.5 cm in thickness. No uterine mass.    Both ovaries are negative. Small amount of free fluid in the pelvis.  Incidentally noted is a tubular, partially fluid-filled structure  posterior to the right ovary without peristalsis and which appears to  end blindly. This may represent a dilated appendix. Patient had  extreme pain when pressure was applied to this area with transvaginal  probe.      Impression    IMPRESSION:  1. Findings which are moderately suspicious for acute appendicitis.  Correlate clinically.  2. Small amount of free fluid.  3. Pelvic structures otherwise negative.    Findings discussed with the referring physician at 2:25 a.m.    FLOR HUMPHRIES MD   Abd/pelvis CT,  IV  contrast only TRAUMA / AAA    Narrative    CT ABDOMEN PELVIS W CONTRAST  1/2/2018 3:31 AM     HISTORY: Concern for appendicitis.     TECHNIQUE: Volumetric acquisition through abdomen and pelvis with IV  contrast. 84 mL Isovue-370. Radiation dose for this scan was reduced  using automated exposure control, adjustment of the mA and/or kV  according to patient size, or iterative reconstruction technique.    COMPARISON: Pelvic ultrasound 1/2/2018.    FINDINGS: Small esophageal hiatal hernia. Visualized lung bases are  clear. The liver, gallbladder, spleen, pancreas, adrenal glands and  kidneys  demonstrate no worrisome findings.     The uterus is present. No suspicious ovarian masses. Prominent  appendix, measuring approximately 1 cm in diameter, extends deep into  the pelvis posterior to the uterus and right ovary as seen on pelvic  ultrasound. No significant fat stranding immediately adjacent to the  appendix. However, there is more generalized fat stranding and a small  amount of fluid in the pelvis. Terminal ileum is fluid filled and  slightly dilated. No bowel obstruction.    Portions of the colon appear slightly thick-walled. This is most  prominent involving the right and transverse colon. A component of  colitis cannot be excluded.      Impression    IMPRESSION:  1. Findings which may all be due to acute appendicitis as suggested on  ultrasound.  2. However, portions of the colon also have a slightly thick-walled  appearance which may be due to colitis. Correlate clinically.  3. Terminal ileum is fluid filled and mildly distended probably due to  associated ileus.    Findings discussed with the referring physician at 3:40 a.m.    FLOR HUMPHRIES MD

## 2018-01-15 ENCOUNTER — OFFICE VISIT (OUTPATIENT)
Dept: SURGERY | Facility: CLINIC | Age: 28
End: 2018-01-15
Payer: COMMERCIAL

## 2018-01-15 VITALS
SYSTOLIC BLOOD PRESSURE: 102 MMHG | OXYGEN SATURATION: 99 % | HEIGHT: 60 IN | WEIGHT: 194 LBS | BODY MASS INDEX: 38.09 KG/M2 | DIASTOLIC BLOOD PRESSURE: 80 MMHG | HEART RATE: 84 BPM | RESPIRATION RATE: 16 BRPM

## 2018-01-15 DIAGNOSIS — Z09 SURGICAL FOLLOWUP VISIT: Primary | ICD-10-CM

## 2018-01-15 PROCEDURE — 99024 POSTOP FOLLOW-UP VISIT: CPT | Performed by: PHYSICIAN ASSISTANT

## 2018-01-15 ASSESSMENT — PAIN SCALES - GENERAL: PAINLEVEL: MILD PAIN (2)

## 2018-01-15 NOTE — MR AVS SNAPSHOT
"              After Visit Summary   1/15/2018    Sonya Alvarado    MRN: [id number]           Patient Information     Date Of Birth          1990        Visit Information        Provider Department      1/15/2018 9:30 AM Jodi Darling PA-C [facility] General Surgery      Today's Diagnoses     Surgical followup visit    -  1       Follow-ups after your visit        Who to contact     If you have questions or need follow up information about today's clinic visit or your schedule please contact [facility] directly at [phone].  Normal or non-critical lab and imaging results will be communicated to you by PostPathhart, letter or phone within 4 business days after the clinic has received the results. If you do not hear from us within 7 days, please contact the clinic through PostPathhart or phone. If you have a critical or abnormal lab result, we will notify you by phone as soon as possible.  Submit refill requests through ZowPow or call your pharmacy and they will forward the refill request to us. Please allow 3 business days for your refill to be completed.          Additional Information About Your Visit        MyChart Information     Z[employer]Pow lets you send messages to your doctor, view your test results, renew your prescriptions, schedule appointments and more. To sign up, go to [email or web address] . Click on \"Log in\" on the left side of the screen, which will take you to the Welcome page. Then click on \"Sign up Now\" on the right side of the page.     You will be asked to enter the access code listed below, as well as some personal information. Please follow the directions to create your username and password.     Your access code is: [email or web address]  Expires: 2018  9:35 AM     Your access code will  in 90 days. If you need help or a new code, please call your [place] clinic or [phone].        Care EveryWhere ID     This " is your Care EveryWhere ID. This could be used by other organizations to access your [place] medical records  [id number]        Your Vitals Were     Pulse Respirations Height Last Period Pulse Oximetry BMI (Body Mass Index)    84 16 1.524 m (5') 12/18/2017 (Exact Date) 99% 37.89 kg/m2       Blood Pressure from Last 3 Encounters:   01/15/18 102/80   01/07/18 124/76    Weight from Last 3 Encounters:   01/15/18 88 kg (194 lb)   01/06/18 88 kg (194 lb 0.1 oz)              Today, you had the following     No orders found for display       Primary Care Provider Office Phone # Fax #    [facility] [phone] [address]  [facility] [address]        Equal Access to Services     GABRIELLA MOROCHO : Hadii aylin aguiar hadasho Soomaali, waaxda luqadaha, qaybta kaalmada adeegyada, lele reyes . [facility] [phone].    ATENCIÓN: Si habla español, tiene a johnson disposición servicios gratuitos de asistencia lingüística. Lizz al [phone].    We comply with applicable federal civil rights laws and [place] laws. We do not discriminate on the basis of race, color, national origin, age, disability, sex, sexual orientation, or gender identity.            Thank you!     Thank you for choosing [facility]  for your care. Our goal is always to provide you with excellent care. Hearing back from our patients is one way we can continue to improve our services. Please take a few minutes to complete the written survey that you may receive in the mail after your visit with us. Thank you!             Your Updated Medication List - Protect others around you: Learn how to safely use, store and throw away your medicines at www.disposemymeds.org.          This list is accurate as of: 1/15/18 10:00 AM.  Always use your most recent med list.                   Brand Name Dispense Instructions for use Diagnosis    FISH OIL PO      Take 2 capsules by mouth  daily

## 2018-01-15 NOTE — PROGRESS NOTES
1/15/2018    Re:  Sonya Alvarado   :  1990      Dear Doctors at [facility],    I had the pleasure of seeing Sonya today in follow-up after her laparoscopic appendectomy an extensive abdominal washout on 2018 by Dr. Summers. The patient tolerated the procedure well. The surgical pathology confirmed acute appendicitis. Sonya is happy to report that her preoperative symptoms have improved. She is now tolerating a regular diet and having normal bowel movements. She reports minimal abdominal pain at her incision sites with sitting straight up today and reports this is slowly improving.    On exam, the patient's incisions are healing well without signs of infection. Steristrips are intact and a normal healing ridge is present at each incision site, as expected. Her abdomen is soft and nontender.     Sonya is recovering well postoperatively. We will be happy to see her in the future as needed. She was encouraged to call us with any questions or concerns.      Sincerely,           Jodi Darling PA-C      Please route or send letter to:  Primary Care Provider (PCP)

## 2021-01-03 ENCOUNTER — HEALTH MAINTENANCE LETTER (OUTPATIENT)
Age: 31
End: 2021-01-03

## 2021-10-10 ENCOUNTER — HEALTH MAINTENANCE LETTER (OUTPATIENT)
Age: 31
End: 2021-10-10

## 2022-01-29 ENCOUNTER — HEALTH MAINTENANCE LETTER (OUTPATIENT)
Age: 32
End: 2022-01-29

## 2022-09-17 ENCOUNTER — HEALTH MAINTENANCE LETTER (OUTPATIENT)
Age: 32
End: 2022-09-17

## 2023-05-06 ENCOUNTER — HEALTH MAINTENANCE LETTER (OUTPATIENT)
Age: 33
End: 2023-05-06

## 2023-07-09 DIAGNOSIS — Z82.49 FAMILY HISTORY OF THORACIC AORTIC ANEURYSM: Primary | ICD-10-CM

## 2023-08-09 ENCOUNTER — HOSPITAL ENCOUNTER (OUTPATIENT)
Dept: CARDIOLOGY | Facility: CLINIC | Age: 33
Discharge: HOME | End: 2023-08-09
Payer: COMMERCIAL

## 2023-08-09 DIAGNOSIS — Z82.49 FAMILY HISTORY OF THORACIC AORTIC ANEURYSM: ICD-10-CM

## 2023-08-09 LAB — LVEF ECHO: NORMAL

## 2023-08-09 PROCEDURE — 93306 TTE W/DOPPLER COMPLETE: CPT

## 2023-08-09 PROCEDURE — 93306 TTE W/DOPPLER COMPLETE: CPT | Mod: 26 | Performed by: INTERNAL MEDICINE

## 2024-07-13 ENCOUNTER — HEALTH MAINTENANCE LETTER (OUTPATIENT)
Age: 34
End: 2024-07-13

## 2025-07-19 ENCOUNTER — HEALTH MAINTENANCE LETTER (OUTPATIENT)
Age: 35
End: 2025-07-19

## (undated) DEVICE — Device

## (undated) DEVICE — SOL ADH LIQUID BENZOIN SWAB 0.6ML C1544

## (undated) DEVICE — STPL ENDO RELOAD 45MM VASCULAR MEDIUM TAN EGIA45AVM

## (undated) DEVICE — ESU PENCIL W/HOLSTER E2350H

## (undated) DEVICE — GLOVE PROTEXIS POWDER FREE 8.0 ORTHOPEDIC 2D73ET80

## (undated) DEVICE — ENDO POUCH GOLD 10MM ECATCH 173050G

## (undated) DEVICE — ENDO CANNULA 05MM VERSAONE UNIVERSAL UNVCA5STF

## (undated) DEVICE — BAG CLEAR TRASH 1.3M 39X33" P4040C

## (undated) DEVICE — ENDO TROCAR BLUNT 100MM W/THRD ANCHOR BLUNTPORT BPT12STS

## (undated) DEVICE — ENDO TROCAR 05MM VERSAONE BLADELESS W/STD FIX CAN NONB5STF

## (undated) DEVICE — STPL ENDO GIA 12MM UNIV 030449

## (undated) DEVICE — SU VICRYL 0 CT-2 CR 8X18" J727D

## (undated) DEVICE — ESU GROUND PAD ADULT W/CORD E7507

## (undated) DEVICE — LINEN TOWEL PACK X10 5473

## (undated) DEVICE — SUCTION CANISTER MEDIVAC LINER 3000ML W/LID 65651-530

## (undated) DEVICE — GLOVE PROTEXIS POWDER FREE SMT 7.5  2D72PT75X

## (undated) DEVICE — PREP CHLORAPREP 26ML TINTED ORANGE  260815

## (undated) DEVICE — ESU CORD MONOPOLAR 10'  E0510

## (undated) DEVICE — SOL NACL 0.9% INJ 1000ML BAG 2B1324X

## (undated) DEVICE — SOL NACL 0.9% IRRIG 1000ML BOTTLE 2F7124

## (undated) DEVICE — DECANTER VIAL 2006S

## (undated) DEVICE — LINEN FULL SHEET 5511

## (undated) DEVICE — SUCTION IRR STRYKERFLOW II W/TIP 250-070-520

## (undated) DEVICE — ESU ELEC BLADE 2.75" COATED/INSULATED E1455

## (undated) DEVICE — LINEN HALF SHEET 5512

## (undated) DEVICE — LINEN POUCH DBL 5427

## (undated) DEVICE — SU VICRYL 4-0 P-3 18" UND J494G

## (undated) DEVICE — BLADE CLIPPER 3M 9670

## (undated) RX ORDER — FENTANYL CITRATE 50 UG/ML
INJECTION, SOLUTION INTRAMUSCULAR; INTRAVENOUS
Status: DISPENSED
Start: 2018-01-02

## (undated) RX ORDER — ACETAMINOPHEN 10 MG/ML
INJECTION, SOLUTION INTRAVENOUS
Status: DISPENSED
Start: 2018-01-02

## (undated) RX ORDER — HYDROMORPHONE HYDROCHLORIDE 1 MG/ML
INJECTION, SOLUTION INTRAMUSCULAR; INTRAVENOUS; SUBCUTANEOUS
Status: DISPENSED
Start: 2018-01-02

## (undated) RX ORDER — BUPIVACAINE HYDROCHLORIDE 5 MG/ML
INJECTION, SOLUTION EPIDURAL; INTRACAUDAL
Status: DISPENSED
Start: 2018-01-02